# Patient Record
Sex: MALE | Race: BLACK OR AFRICAN AMERICAN | NOT HISPANIC OR LATINO | Employment: FULL TIME | URBAN - METROPOLITAN AREA
[De-identification: names, ages, dates, MRNs, and addresses within clinical notes are randomized per-mention and may not be internally consistent; named-entity substitution may affect disease eponyms.]

---

## 2017-04-24 ENCOUNTER — HOSPITAL ENCOUNTER (EMERGENCY)
Facility: CLINIC | Age: 37
Discharge: HOME | End: 2017-04-24
Attending: EMERGENCY MEDICINE | Admitting: EMERGENCY MEDICINE

## 2017-04-24 ENCOUNTER — OFFICE VISIT (OUTPATIENT)
Dept: FAMILY MEDICINE | Facility: CLINIC | Age: 37
End: 2017-04-24

## 2017-04-24 ENCOUNTER — APPOINTMENT (OUTPATIENT)
Dept: GENERAL RADIOLOGY | Facility: CLINIC | Age: 37
End: 2017-04-24
Attending: EMERGENCY MEDICINE

## 2017-04-24 VITALS
HEART RATE: 60 BPM | DIASTOLIC BLOOD PRESSURE: 86 MMHG | OXYGEN SATURATION: 100 % | HEIGHT: 72 IN | TEMPERATURE: 98.3 F | RESPIRATION RATE: 16 BRPM | SYSTOLIC BLOOD PRESSURE: 127 MMHG | BODY MASS INDEX: 32.51 KG/M2 | WEIGHT: 240 LBS

## 2017-04-24 VITALS
DIASTOLIC BLOOD PRESSURE: 80 MMHG | TEMPERATURE: 97.8 F | SYSTOLIC BLOOD PRESSURE: 120 MMHG | HEART RATE: 64 BPM | OXYGEN SATURATION: 99 %

## 2017-04-24 DIAGNOSIS — R07.9 CHEST PAIN, UNSPECIFIED TYPE: Primary | ICD-10-CM

## 2017-04-24 DIAGNOSIS — R07.89 CHEST WALL PAIN: ICD-10-CM

## 2017-04-24 LAB
ALBUMIN SERPL-MCNC: 3.7 G/DL (ref 3.4–5)
ALP SERPL-CCNC: 52 U/L (ref 40–150)
ALT SERPL W P-5'-P-CCNC: 34 U/L (ref 0–70)
ANION GAP SERPL CALCULATED.3IONS-SCNC: 6 MMOL/L (ref 3–14)
AST SERPL W P-5'-P-CCNC: 18 U/L (ref 0–45)
BASOPHILS # BLD AUTO: 0 10E9/L (ref 0–0.2)
BASOPHILS NFR BLD AUTO: 0.2 %
BILIRUB SERPL-MCNC: 0.3 MG/DL (ref 0.2–1.3)
BUN SERPL-MCNC: 17 MG/DL (ref 7–30)
CALCIUM SERPL-MCNC: 8.4 MG/DL (ref 8.5–10.1)
CHLORIDE SERPL-SCNC: 108 MMOL/L (ref 94–109)
CO2 SERPL-SCNC: 26 MMOL/L (ref 20–32)
CREAT SERPL-MCNC: 1.2 MG/DL (ref 0.66–1.25)
D DIMER PPP FEU-MCNC: NORMAL UG/ML FEU (ref 0–0.5)
DIFFERENTIAL METHOD BLD: NORMAL
EOSINOPHIL # BLD AUTO: 0.2 10E9/L (ref 0–0.7)
EOSINOPHIL NFR BLD AUTO: 2.1 %
ERYTHROCYTE [DISTWIDTH] IN BLOOD BY AUTOMATED COUNT: 13.7 % (ref 10–15)
GFR SERPL CREATININE-BSD FRML MDRD: 69 ML/MIN/1.7M2
GLUCOSE SERPL-MCNC: 79 MG/DL (ref 70–99)
HCT VFR BLD AUTO: 40.3 % (ref 40–53)
HGB BLD-MCNC: 13.7 G/DL (ref 13.3–17.7)
IMM GRANULOCYTES # BLD: 0 10E9/L (ref 0–0.4)
IMM GRANULOCYTES NFR BLD: 0.5 %
INTERPRETATION ECG - MUSE: NORMAL
LIPASE SERPL-CCNC: 105 U/L (ref 73–393)
LYMPHOCYTES # BLD AUTO: 1.9 10E9/L (ref 0.8–5.3)
LYMPHOCYTES NFR BLD AUTO: 21.6 %
MCH RBC QN AUTO: 29.8 PG (ref 26.5–33)
MCHC RBC AUTO-ENTMCNC: 34 G/DL (ref 31.5–36.5)
MCV RBC AUTO: 88 FL (ref 78–100)
MONOCYTES # BLD AUTO: 0.7 10E9/L (ref 0–1.3)
MONOCYTES NFR BLD AUTO: 7.8 %
NEUTROPHILS # BLD AUTO: 5.8 10E9/L (ref 1.6–8.3)
NEUTROPHILS NFR BLD AUTO: 67.8 %
NRBC # BLD AUTO: 0 10*3/UL
NRBC BLD AUTO-RTO: 0 /100
PLATELET # BLD AUTO: 225 10E9/L (ref 150–450)
POTASSIUM SERPL-SCNC: 4 MMOL/L (ref 3.4–5.3)
PROT SERPL-MCNC: 7.2 G/DL (ref 6.8–8.8)
RBC # BLD AUTO: 4.59 10E12/L (ref 4.4–5.9)
SODIUM SERPL-SCNC: 140 MMOL/L (ref 133–144)
TROPONIN I SERPL-MCNC: NORMAL UG/L (ref 0–0.04)
WBC # BLD AUTO: 8.6 10E9/L (ref 4–11)

## 2017-04-24 PROCEDURE — 93005 ELECTROCARDIOGRAM TRACING: CPT

## 2017-04-24 PROCEDURE — 84484 ASSAY OF TROPONIN QUANT: CPT | Performed by: EMERGENCY MEDICINE

## 2017-04-24 PROCEDURE — 80053 COMPREHEN METABOLIC PANEL: CPT | Performed by: EMERGENCY MEDICINE

## 2017-04-24 PROCEDURE — 96374 THER/PROPH/DIAG INJ IV PUSH: CPT

## 2017-04-24 PROCEDURE — 96361 HYDRATE IV INFUSION ADD-ON: CPT

## 2017-04-24 PROCEDURE — 85025 COMPLETE CBC W/AUTO DIFF WBC: CPT | Performed by: EMERGENCY MEDICINE

## 2017-04-24 PROCEDURE — 40000985 XR CHEST 2 VW

## 2017-04-24 PROCEDURE — 99207 ZZC NO CHARGE LOS: CPT | Performed by: PHYSICIAN ASSISTANT

## 2017-04-24 PROCEDURE — 83690 ASSAY OF LIPASE: CPT | Performed by: EMERGENCY MEDICINE

## 2017-04-24 PROCEDURE — 25000128 H RX IP 250 OP 636: Performed by: EMERGENCY MEDICINE

## 2017-04-24 PROCEDURE — 99285 EMERGENCY DEPT VISIT HI MDM: CPT | Mod: 25

## 2017-04-24 PROCEDURE — 85379 FIBRIN DEGRADATION QUANT: CPT | Performed by: EMERGENCY MEDICINE

## 2017-04-24 RX ORDER — KETOROLAC TROMETHAMINE 30 MG/ML
30 INJECTION, SOLUTION INTRAMUSCULAR; INTRAVENOUS ONCE
Status: COMPLETED | OUTPATIENT
Start: 2017-04-24 | End: 2017-04-24

## 2017-04-24 RX ORDER — SODIUM CHLORIDE 9 MG/ML
1000 INJECTION, SOLUTION INTRAVENOUS CONTINUOUS
Status: DISCONTINUED | OUTPATIENT
Start: 2017-04-24 | End: 2017-04-24 | Stop reason: HOSPADM

## 2017-04-24 RX ADMIN — SODIUM CHLORIDE 1000 ML: 9 INJECTION, SOLUTION INTRAVENOUS at 16:13

## 2017-04-24 RX ADMIN — KETOROLAC TROMETHAMINE 30 MG: 30 INJECTION, SOLUTION INTRAMUSCULAR at 16:13

## 2017-04-24 ASSESSMENT — ENCOUNTER SYMPTOMS
FATIGUE: 1
VOMITING: 0
SHORTNESS OF BREATH: 1
BACK PAIN: 1
FEVER: 0
COUGH: 0
DIARRHEA: 0
NAUSEA: 0

## 2017-04-24 NOTE — ED NOTES
Patient discharged to home. Patient received follow-up information as needed with PCP. Patient received discharge instructions and has no other questions at this time.

## 2017-04-24 NOTE — ED PROVIDER NOTES
History     Chief Complaint:  Chest pain and back pain    HPI   Kin Pagan is a 36 year old male with a history of anxiety who presents with chest pain and back pain. 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 He states he normally does not work the night shift. The patient went home and went to sleep and when he woke up the chest pain became more severe and started to radiate to into his back. He describes his back pain as feeling as though he pulled a muscle. Deep breathing, moving his arms, and sitting up exacerbates the pain. Eating warm food and taking ibuprofen alleviated the pain. He states that when he sits up he begins to experience shortness of breath due to the increased chest pain. He state that last night he woke up intermittently gasping for air. The patient presented to clinic today, was given 3 chewable baby aspirin, and was sent to the ED for further evaluation. He also states that he experienced pain on the outside of his left calf a couple days ago that alleviated on its own. The patient also notes that he has had a rash on his upper back for a long time. The patient has been experiencing increased stress recently with family and legal issues. The patient states that he has had similar symptoms when experiencing increased stress in the past and was told it was due to anxiety. The patient exercised 3 days ago with no symptoms. The patient does not have regular medical care due to lack of insurance. The pain in his chest does not radiate down his arm or into his neck. No nausea, vomiting, or diarrhea. No leg swelling. No recent cough or fever. No suicidal or homicidal ideation but does feel stressed.    Cardiac Risk Factors   Sex: male   Tobacco: former smoker  Hypertension: Negative  Diabetes: Negative  Hyperlipidemia: positive  Family History:  Negative    PE/DVT Risk Factors   Personal History: Negative  Recent Travel: Negative   Recent Surgery/Hospitalization: Negative  Tobacco: former smoker  Family History: Negative  Hormone Use: Negative   Cancer: Negative  Trauma: Negative      Allergies:  No known drug allergies.     Medications:    Hyoscyamine  Albuterol nebulizer     Past Medical History:    Hyperlipidemia  Anxiety  Allergic state    Past Surgical History:    History reviewed. No pertinent past surgical history.    Family History:    History reviewed. No pertinent family history.    Social History:  Marital Status: single  Presents to the ED alone  Tobacco Use: former smoker  Alcohol Use: positive  PCP: [facility]     Review of Systems   Constitutional: Positive for fatigue. Negative for fever.   Respiratory: Positive for shortness of breath. Negative for cough.    Cardiovascular: Positive for chest pain. Negative for leg swelling.   Gastrointestinal: Negative for diarrhea, nausea and vomiting.   Musculoskeletal: Positive for back pain.        Positive for left calf pain   Skin: Positive for rash.   All other systems reviewed and are negative.    Physical Exam   First Vitals:  BP: (!) 146/94  Pulse: 60  Heart Rate: 60  Temp: 98.3  F (36.8  C)  Resp: 20  Height: 182.9 cm (6')  Weight: 108.9 kg (240 lb)  SpO2: 100 %    Physical Exam  VITAL SIGNS: /63  Pulse 60  Temp 98.3  F (36.8  C) (Oral)  Resp 16  Ht 1.829 m (6')  Wt 108.9 kg (240 lb)  SpO2 100%  BMI 32.55 kg/m2   Constitutional:  Well developed, Well nourished, comfortable appearing, mildly anxious.   HENT:  Bilateral external ears normal, Mucous membranes moist, Nose normal. Neck- Normal range of motion, Supple  Respiratory:  Normal breath sounds, No respiratory distress, No wheezing,  Cardiovascular:  Normal heart rate, Normal rhythm, No murmurs, Radial pulses are equal and intact bilaterally.  GI:  Bowel sounds normal, Soft, No tenderness,   Musculoskeletal:  Intact  distal pulses, No edema, grossly unremarkable range of motion. No chest wall tenderness to palpation. No calf tenderness. No masses.   Integument:  Warm, Dry   Neurologic:  Alert, attentive and appropriately oriented  Psychiatric:  Mildly anxious    Emergency Department Course   ECG:  @ 1514  Indication: chest pain and back pain  Vent. Rate 59 bpm. WV interval 178 ms. QRS duration 100 ms. QT/QTc 404/399 ms. P-R-T axis 55 54 41.   Sinus bradycardia. Otherwise normal ECG.  No significant change when compared to previous ECG from 7/24/15   Read @ 1542 by Dr. Palomino.    Imaging:  Radiographic findings were communicated with the patient who voiced understanding of the findings.    XR Chest 2 Views  Normal.  Preliminary radiology read.      Laboratory:  CBC:  WBC 8.6, HGB 13.7, , otherwise WNL  CMP: calcium 8.4 (L), otherwise WNL (Creatinine 1.20)  Lipase: 105  Troponin: <0.015  D dimer: <0.3    Interventions:  (1745) Normal Saline, 1 liter, IV bolus  (1613) Toradol, 30 mg, IV injection    Emergency Department Course:  Nursing notes and vitals reviewed.  I performed an exam of the patient as documented above.   EKG was done, interpretation as above.  A peripheral IV was established. Blood was drawn from the patient. This was sent for laboratory testing, findings above.   The patient was sent for a chest x-ray while in the emergency department, findings above.   (1715) Pain completely relieved after Toradol. He is now able to move without discomfort. Pulses remain equal.  (1820) I rechecked the patient and updated him on his results.  Findings and plan explained to the patient. Patient discharged home with instructions regarding supportive care, medications, and reasons to return. The importance of close follow-up was reviewed.   I personally reviewed the laboratory results with the patient and answered all related questions prior to discharge.     Impression & Plan    Medical Decision Making:  Kin Pagan  presents with chest pain.  The work up in the Emergency Department is negative.  The differential diagnosis of chest pain is broad and includes life threatening etiologies such as Acute coronary syndrome, Myocardial infarction, Pulmonary Embolism, and Acute Aortic Dissection.  Other causes may include pneumonia, pneumothorax, pericarditis, pleurisy, and esophageal spasm.  No serious etiology for the chest pain was detected today during this visit.  Because of the nature of his symptoms, extensive workup including troponin, abdominal labs, and imaging were done.  Of note, the patient's pain completely resolved after Toradol.  He had no pain on exam and had resolution of his mild hypertension with treatment of this pain. Given this, I felt that acute dissection was unlikely; ddimer in a pt with no risk factors as well as lack of tachycardia, hypoxia, made PE very unlikely.    Close follow up with primary care is indicated should the pain continue, as further work up may be performed; this was made clear to Kin, who understands.  I discussed he should return immediately for any worsening symptoms including any intense pain, radiation of the pain, numbness, difficulty breathing.  He is quite comfortable with this.  Also offered resources for his depression related to life stressors and emphasized that if he feels self-harm or any other severe symptoms he may return to the ER at any time.  He agreed to this plan.    Diagnosis:    ICD-10-CM    1. Chest wall pain R07.89        Disposition:  Discharge to home.     I, Darell Ferreira, am serving as a scribe on 4/24/2017 at 3:19 PM to personally document services performed by Dr. Palomino based on my observations and the provider's statements to me.     Latricia Palomino MD  04/25/17 0001

## 2017-04-24 NOTE — ED AVS SNAPSHOT
[facility] Emergency Department    [address]    [facility] [address]    Phone:  [phone]    Fax:  [phone]                                       Kin Pagan   MRN: [id number]    Department:  [facility] Emergency Department   Date of Visit:  4/24/2017           After Visit Summary Signature Page     I have received my discharge instructions, and my questions have been answered. I have discussed any challenges I see with this plan with the nurse or doctor.    ..........................................................................................................................................  Patient/Patient Representative Signature      ..........................................................................................................................................  Patient Representative Print Name and Relationship to Patient    ..................................................               ................................................  Date                                            Time    ..........................................................................................................................................  Reviewed by Signature/Title    ...................................................              ..............................................  Date                                                            Time

## 2017-04-24 NOTE — DISCHARGE INSTRUCTIONS
Get extra rest and drink plenty of fluids. You may take acetaminophen (Tylenol) 650mg or ibuprofen (Advil/Motrin) 600mg, up to every 6 hours, with food, for fevers/aches.     See a primary care clinic for followup within the next 5-7 days if symptoms persist.    If you have any worsening/severe symptoms return to the ER right away.         Chest Wall Pain: Costochondritis    The chest pain that you have had today is caused by costochondritis. This condition is caused by an inflammation of the cartilage joining your ribs to your breastbone. It is not caused by heart or lung problems. The inflammation may have been brought on by a blow to the chest, lifting heavy objects, intense exercise, or an illness that made you cough and sneeze. It often occurs during times of emotional stress. It can be painful, but it is not dangerous. It usually goes away in 1 to 2 weeks. But it may happen again. Rarely, a more serious condition may cause symptoms similar to costochondritis. That s why it s important to watch for the warning signs listed below.  Home care  Follow these guidelines when caring for yourself at home:    If you feel that emotional stress is a cause of your condition, try to figure out the sources of that stress. It may not be obvious! Learn ways to deal with the stress in your life. This can include regular exercise, muscle relaxation, meditation, or simply taking time out for yourself. For more information about this, talk with your health care provider. Or go to your local library and look at books on  stress reduction.     You may use acetaminophen or ibuprofen to control pain, unless another pain medicine was prescribed. If you have liver disease or ever had a stomach ulcer, talk with your health care provider before using these medicines.    You can also help ease pain by using a hot, wet compress or heating pad. Use this with or without a medicated skin cream that helps relieves pain.    Do stretching  exercise as advised by your provider.    Take any prescribed medicines as directed.  Follow-up care  Follow up with your health care provider, or as advised, if you do not start to get better in the next 2 days.  When to seek medical advice  Call your health care provider right away if any of these occur:    A change in the type of pain. Call if it feels different, becomes more serious, lasts longer, or spreads into your shoulder, arm, neck, jaw, or back.    Shortness of breath or pain gets worse when you breathe    Weakness, dizziness, or fainting    Cough with dark-colored sputum (phlegm) or blood    Abdominal pain    Dark red or black stools    Fever of 100.4 F (38 C) or higher, or as directed by your health care provider    4329-2018 [employer]. [address] [place] [address], [place] [address]. All rights reserved. This information is not intended as a substitute for professional medical care. Always follow your healthcare professional's instructions.

## 2017-04-24 NOTE — ED NOTES
"Chest pain and upper back pain started yesterday while at work. Pain is \"sharp and throbbing\", worse with deep breath.    Seen at [facility] and referred to ED, given aspirin there. Patient refused EMS transport.  "

## 2017-04-24 NOTE — LETTER
[facility] EMERGENCY DEPARTMENT  [address]  [facility] [address]  [phone]    Kin Pagan  [address]  [facility] [address]  [phone] (home) NONE (work)    : 1980      To Whom it may concern:    Kin Pagan was seen in our Emergency Department today, 2017. He should not do any heavy lifting more than 5lbs through Friday.    Sincerely,        Latricia Palomino

## 2017-04-24 NOTE — NURSING NOTE
"Chief Complaint   Patient presents with     Back Pain     Mid Cx pain radiates thru upper back, SOB x2 days       Initial /80 (BP Location: Right arm, Patient Position: Chair, Cuff Size: Adult Large)  Pulse 64  Temp 97.8  F (36.6  C) (Oral)  SpO2 99% Estimated body mass index is 33 kg/(m^2) as calculated from the following:    Height as of 12/16/13: 5' 10\" (1.778 m).    Weight as of 12/16/13: 230 lb (104.3 kg).  Medication Reconciliation: complete     Latricia Bolanos CMA (AAMA)        "

## 2017-04-24 NOTE — MR AVS SNAPSHOT
"              After Visit Summary   2017    Kin Pagan    MRN: [id number]           Patient Information     Date Of Birth          1980        Visit Information        Provider Department      2017 2:15 PM Aaron Larkin PA-C [facility]        Today's Diagnoses     Chest pain, unspecified type    -  1       Follow-ups after your visit        Who to contact     If you have questions or need follow up information about today's clinic visit or your schedule please contact [facility] directly at [phone].  Normal or non-critical lab and imaging results will be communicated to you by [employer], letter or phone within 4 business days after the clinic has received the results. If you do not hear from us within 7 days, please contact the clinic through [employer] or phone. If you have a critical or abnormal lab result, we will notify you by phone as soon as possible.  Submit refill requests through [employer] or call your pharmacy and they will forward the refill request to us. Please allow 3 business days for your refill to be completed.          Additional Information About Your Visit        MyChart Information     [employer] lets you send messages to your doctor, view your test results, renew your prescriptions, schedule appointments and more. To sign up, go to [email or web address] . Click on \"Log in\" on the left side of the screen, which will take you to the Welcome page. Then click on \"Sign up Now\" on the right side of the page.     You will be asked to enter the access code listed below, as well as some personal information. Please follow the directions to create your username and password.     Your access code is: [email or web address]  Expires: 2017  2:58 PM     Your access code will  in 90 days. If you need help or a new code, please call your [facility] or [phone].        Care EveryWhere ID     This is your Care EveryWhere ID. This could be " used by other organizations to access your [facility] medical records  [id number]        Your Vitals Were     Pulse Temperature Pulse Oximetry             64 97.8  F (36.6  C) (Oral) 99%          Blood Pressure from Last 3 Encounters:   04/24/17 120/80   02/14/16 120/80   07/24/15 124/62    Weight from Last 3 Encounters:   12/16/13 230 lb (104.3 kg)   12/14/13 233 lb (105.7 kg)   12/12/13 233 lb (105.7 kg)              Today, you had the following     No orders found for display       Primary Care Provider Office Phone # Fax #    [facility] [phone] [address] [place] [address]        Thank you!     Thank you for choosing [facility]  for your care. Our goal is always to provide you with excellent care. Hearing back from our patients is one way we can continue to improve our services. Please take a few minutes to complete the written survey that you may receive in the mail after your visit with us. Thank you!             Your Updated Medication List - Protect others around you: Learn how to safely use, store and throw away your medicines at www.disposemymeds.org.          This list is accurate as of: 4/24/17  2:58 PM.  Always use your most recent med list.                   Brand Name Dispense Instructions for use    albuterol (2.5 MG/3ML) 0.083% neb solution     1 Box    Take 3 mLs (2.5 mg) by nebulization every 4 hours as needed for shortness of breath / dyspnea       hyoscyamine 0.125 MG tablet    ANASPAZ/LEVSIN    12 tablet    Take 1 tablet (125 mcg) by mouth every 6 hours as needed for cramping       order for DME     1 Device    Equipment being ordered: Nebulizer       order for DME     1 Device    Equipment being ordered: Aircast splint

## 2017-04-24 NOTE — PROGRESS NOTES
Kin is a 36 year male with substernal chest pain radiating to the upper back for 2 days and reports associated shortness of breath.  The left leg has some pain as well. I sent him to the ER.   I asked him to let me provide an ambulance and he declined.

## 2017-04-24 NOTE — ED AVS SNAPSHOT
[facility] Emergency Department    [address]    [facility] [address]    Phone:  [phone]    Fax:  [phone]                                       Kin Pagan   MRN: [id number]    Department:  [facility] Emergency Department   Date of Visit:  4/24/2017           Patient Information     Date Of Birth          1980        Your diagnoses for this visit were:     Chest wall pain        You were seen by Latricia Palomino MD.      Follow-up Information     Follow up with [facility]. Schedule an appointment as soon as possible for a visit in 2 days.    Specialty:  Family Medicine    Contact information:    [address] [facility] [address] [phone]        Discharge Instructions       Get extra rest and drink plenty of fluids. You may take acetaminophen (Tylenol) 650mg or ibuprofen (Advil/Motrin) 600mg, up to every 6 hours, with food, for fevers/aches.     See a primary care clinic for followup within the next 5-7 days if symptoms persist.    If you have any worsening/severe symptoms return to the ER right away.         Chest Wall Pain: Costochondritis    The chest pain that you have had today is caused by costochondritis. This condition is caused by an inflammation of the cartilage joining your ribs to your breastbone. It is not caused by heart or lung problems. The inflammation may have been brought on by a blow to the chest, lifting heavy objects, intense exercise, or an illness that made you cough and sneeze. It often occurs during times of emotional stress. It can be painful, but it is not dangerous. It usually goes away in 1 to 2 weeks. But it may happen again. Rarely, a more serious condition may cause symptoms similar to costochondritis. That s why it s important to watch for the warning signs listed below.  Home care  Follow these guidelines when caring for yourself at home:    If you feel that emotional stress is a  cause of your condition, try to figure out the sources of that stress. It may not be obvious! Learn ways to deal with the stress in your life. This can include regular exercise, muscle relaxation, meditation, or simply taking time out for yourself. For more information about this, talk with your health care provider. Or go to your local library and look at books on  stress reduction.     You may use acetaminophen or ibuprofen to control pain, unless another pain medicine was prescribed. If you have liver disease or ever had a stomach ulcer, talk with your health care provider before using these medicines.    You can also help ease pain by using a hot, wet compress or heating pad. Use this with or without a medicated skin cream that helps relieves pain.    Do stretching exercise as advised by your provider.    Take any prescribed medicines as directed.  Follow-up care  Follow up with your health care provider, or as advised, if you do not start to get better in the next 2 days.  When to seek medical advice  Call your health care provider right away if any of these occur:    A change in the type of pain. Call if it feels different, becomes more serious, lasts longer, or spreads into your shoulder, arm, neck, jaw, or back.    Shortness of breath or pain gets worse when you breathe    Weakness, dizziness, or fainting    Cough with dark-colored sputum (phlegm) or blood    Abdominal pain    Dark red or black stools    Fever of 100.4 F (38 C) or higher, or as directed by your health care provider    8502-3918 [employer]. [address] [place] [address]. All rights reserved. This information is not intended as a substitute for professional medical care. Always follow your healthcare professional's instructions.          24 Hour Appointment Hotline       To make an appointment at any [facility] call [phone] ([phone]). If you don't have a family doctor or clinic, we will help you  find one. [facility] are conveniently located to serve the needs of you and your family.             Review of your medicines      Our records show that you are taking the medicines listed below. If these are incorrect, please call your family doctor or clinic.        Dose / Directions Last dose taken    albuterol (2.5 MG/3ML) 0.083% neb solution   Dose:  1 ampule   Quantity:  1 Box        Take 3 mLs (2.5 mg) by nebulization every 4 hours as needed for shortness of breath / dyspnea   Refills:  0        hyoscyamine 0.125 MG tablet   Commonly known as:  ANASPAZ/LEVSIN   Dose:  0.125 mg   Quantity:  12 tablet        Take 1 tablet (125 mcg) by mouth every 6 hours as needed for cramping   Refills:  0        order for DME   Quantity:  1 Device        Equipment being ordered: Nebulizer   Refills:  0        order for DME   Quantity:  1 Device        Equipment being ordered: Aircast splint   Refills:  0                Procedures and tests performed during your visit     CBC with platelets differential    Comprehensive metabolic panel    D dimer quantitative    EKG 12 lead    Lipase    Troponin I    XR Chest 2 Views      Orders Needing Specimen Collection     None      Pending Results     Date and Time Order Name Status Description    4/24/2017 1713 XR Chest 2 Views Preliminary     4/24/2017 1646 EKG 12 lead Preliminary             Pending Culture Results     No orders found from 4/22/2017 to 4/25/2017.            Test Results From Your Hospital Stay        4/24/2017  4:22 PM      Component Results     Component Value Ref Range & Units Status    WBC 8.6 4.0 - 11.0 10e9/L Final    RBC Count 4.59 4.4 - 5.9 10e12/L Final    Hemoglobin 13.7 13.3 - 17.7 g/dL Final    Hematocrit 40.3 40.0 - 53.0 % Final    MCV 88 78 - 100 fl Final    MCH 29.8 26.5 - 33.0 pg Final    MCHC 34.0 31.5 - 36.5 g/dL Final    RDW 13.7 10.0 - 15.0 % Final    Platelet Count 225 150 - 450 10e9/L Final    Diff Method Automated Method  Final    %  Neutrophils 67.8 % Final    % Lymphocytes 21.6 % Final    % Monocytes 7.8 % Final    % Eosinophils 2.1 % Final    % Basophils 0.2 % Final    % Immature Granulocytes 0.5 % Final    Nucleated RBCs 0 0 /100 Final    Absolute Neutrophil 5.8 1.6 - 8.3 10e9/L Final    Absolute Lymphocytes 1.9 0.8 - 5.3 10e9/L Final    Absolute Monocytes 0.7 0.0 - 1.3 10e9/L Final    Absolute Eosinophils 0.2 0.0 - 0.7 10e9/L Final    Absolute Basophils 0.0 0.0 - 0.2 10e9/L Final    Abs Immature Granulocytes 0.0 0 - 0.4 10e9/L Final    Absolute Nucleated RBC 0.0  Final         4/24/2017  4:43 PM      Component Results     Component Value Ref Range & Units Status    Sodium 140 133 - 144 mmol/L Final    Potassium 4.0 3.4 - 5.3 mmol/L Final    Chloride 108 94 - 109 mmol/L Final    Carbon Dioxide 26 20 - 32 mmol/L Final    Anion Gap 6 3 - 14 mmol/L Final    Glucose 79 70 - 99 mg/dL Final    Urea Nitrogen 17 7 - 30 mg/dL Final    Creatinine 1.20 0.66 - 1.25 mg/dL Final    GFR Estimate 69 >60 mL/min/1.7m2 Final    Non  GFR Calc    GFR Estimate If Black 83 >60 mL/min/1.7m2 Final    African American GFR Calc    Calcium 8.4 (L) 8.5 - 10.1 mg/dL Final    Bilirubin Total 0.3 0.2 - 1.3 mg/dL Final    Albumin 3.7 3.4 - 5.0 g/dL Final    Protein Total 7.2 6.8 - 8.8 g/dL Final    Alkaline Phosphatase 52 40 - 150 U/L Final    ALT 34 0 - 70 U/L Final    AST 18 0 - 45 U/L Final         4/24/2017  4:43 PM      Component Results     Component Value Ref Range & Units Status    Lipase 105 73 - 393 U/L Final         4/24/2017  4:43 PM      Component Results     Component Value Ref Range & Units Status    Troponin I ES  0.000 - 0.045 ug/L Final    <0.015  The 99th percentile for upper reference range is 0.045 ug/L.  Troponin values in   the range of 0.045 - 0.120 ug/L may be associated with risks of adverse   clinical events.           4/24/2017  4:33 PM      Component Results     Component Value Ref Range & Units Status    D Dimer  0.0 - 0.50  ug/ml FEU Final    <0.3  This D-dimer assay is intended for use in conjuntion with a clinical pretest   probability assessment model to exclude pulmonary embolism (PE) and as an aid   in the diagnosis of deep venous thrombosis (DVT) in outpatients suspected of PE   or DVT. The cut-off value is 0.5 g/mL FEU.           4/24/2017  6:13 PM      Narrative     CHEST TWO VIEWS   4/24/2017 5:36 PM     HISTORY: Chest pain.    COMPARISON: 7/24/2015.        Impression     IMPRESSION: Normal.                Clinical Quality Measure: Blood Pressure Screening     Your blood pressure was checked while you were in the emergency department today. The last reading we obtained was  BP: 113/63 . Please read the guidelines below about what these numbers mean and what you should do about them.  If your systolic blood pressure (the top number) is less than 120 and your diastolic blood pressure (the bottom number) is less than 80, then your blood pressure is normal. There is nothing more that you need to do about it.  If your systolic blood pressure (the top number) is 120-139 or your diastolic blood pressure (the bottom number) is 80-89, your blood pressure may be higher than it should be. You should have your blood pressure rechecked within a year by a primary care provider.  If your systolic blood pressure (the top number) is 140 or greater or your diastolic blood pressure (the bottom number) is 90 or greater, you may have high blood pressure. High blood pressure is treatable, but if left untreated over time it can put you at risk for heart attack, stroke, or kidney failure. You should have your blood pressure rechecked by a primary care provider within the next 4 weeks.  If your provider in the emergency department today gave you specific instructions to follow-up with your doctor or provider even sooner than that, you should follow that instruction and not wait for up to 4 weeks for your follow-up visit.        Thank you for choosing  "[facility]       Thank you for choosing [facility] for your care. Our goal is always to provide you with excellent care. Hearing back from our patients is one way we can continue to improve our services. Please take a few minutes to complete the written survey that you may receive in the mail after you visit with us. Thank you!        [employer] Information     [employer] lets you send messages to your doctor, view your test results, renew your prescriptions, schedule appointments and more. To sign up, go to [email or web address] . Click on \"Log in\" on the left side of the screen, which will take you to the Welcome page. Then click on \"Sign up Now\" on the right side of the page.     You will be asked to enter the access code listed below, as well as some personal information. Please follow the directions to create your username and password.     Your access code is: [email or web address]  Expires: 2017  2:58 PM     Your access code will  in 90 days. If you need help or a new code, please call your [facility] clinic or [phone].        Care EveryWhere ID     This is your Care EveryWhere ID. This could be used by other organizations to access your [facility] medical records  [id number]        After Visit Summary       This is your record. Keep this with you and show to your community pharmacist(s) and doctor(s) at your next visit.                  "

## 2017-04-24 NOTE — PROGRESS NOTES
SUBJECTIVE:                                                    Kin Pagan is a 36 year old male who presents to clinic today for the following health issues:      Chest Pain      Onset: x2 days    Description (location/character/radiation/duration): mid Cx, hard to breath    Intensity:  severe    Accompanying signs and symptoms:        Shortness of breath: YES       Sweating: no        Nausea/vomitting: YES       Palpitations: no        Other (fevers/chills/cough/heartburn/lightheadedness): no     History (similar episodes/previous evaluation): None    Precipitating or alleviating factors:       Worse with exertion: no        Worse with breathing: YES       Related to eating: no        Better with burping: no     Therapies tried and outcome: None           Problem list and histories reviewed & adjusted, as indicated.        Reviewed and updated as needed this visit by clinical staff  Allergies  Meds       Reviewed and updated as needed this visit by Provider         I sent the patient to the ER.    Wellington Larkin PA-C

## 2018-11-30 ENCOUNTER — APPOINTMENT (OUTPATIENT)
Dept: CT IMAGING | Facility: CLINIC | Age: 38
DRG: 392 | End: 2018-11-30
Attending: EMERGENCY MEDICINE

## 2018-11-30 ENCOUNTER — HOSPITAL ENCOUNTER (INPATIENT)
Facility: CLINIC | Age: 38
LOS: 2 days | Discharge: HOME | DRG: 392 | End: 2018-12-02
Attending: EMERGENCY MEDICINE | Admitting: INTERNAL MEDICINE

## 2018-11-30 DIAGNOSIS — K57.32 DIVERTICULITIS OF COLON: ICD-10-CM

## 2018-11-30 PROBLEM — K57.20 DIVERTICULITIS OF COLON WITH PERFORATION: Status: ACTIVE | Noted: 2018-11-30

## 2018-11-30 PROBLEM — K57.80 DIVERTICULITIS OF INTESTINE WITH PERFORATION: Status: ACTIVE | Noted: 2018-11-30

## 2018-11-30 LAB
ALBUMIN UR-MCNC: NEGATIVE MG/DL
ANION GAP SERPL CALCULATED.3IONS-SCNC: 11 MMOL/L (ref 6–17)
ANION GAP SERPL CALCULATED.3IONS-SCNC: 4 MMOL/L (ref 3–14)
APPEARANCE UR: ABNORMAL
BASOPHILS # BLD AUTO: 0.1 10E9/L (ref 0–0.2)
BASOPHILS NFR BLD AUTO: 0.4 %
BILIRUB UR QL STRIP: NEGATIVE
BUN SERPL-MCNC: 22 MG/DL (ref 5–24)
BUN SERPL-MCNC: 22 MG/DL (ref 7–30)
CA-I BLD-SCNC: 4.7 MG/DL (ref 4.4–5.2)
CALCIUM SERPL-MCNC: 8.4 MG/DL (ref 8.5–10.1)
CHLORIDE BLD-SCNC: 103 MMOL/L (ref 94–109)
CHLORIDE SERPL-SCNC: 106 MMOL/L (ref 94–109)
CO2 BLD-SCNC: 27 MMOL/L (ref 20–32)
CO2 SERPL-SCNC: 29 MMOL/L (ref 20–32)
COLOR UR AUTO: YELLOW
CREAT BLD-MCNC: 1.2 MG/DL (ref 0.66–1.25)
CREAT SERPL-MCNC: 1.25 MG/DL (ref 0.66–1.25)
DIFFERENTIAL METHOD BLD: ABNORMAL
EOSINOPHIL # BLD AUTO: 0.1 10E9/L (ref 0–0.7)
EOSINOPHIL NFR BLD AUTO: 1.2 %
ERYTHROCYTE [DISTWIDTH] IN BLOOD BY AUTOMATED COUNT: 13.9 % (ref 10–15)
GFR SERPL CREATININE-BSD FRML MDRD: 65 ML/MIN/1.7M2
GFR SERPL CREATININE-BSD FRML MDRD: 69 ML/MIN/1.7M2
GLUCOSE BLD-MCNC: 107 MG/DL (ref 70–99)
GLUCOSE SERPL-MCNC: 98 MG/DL (ref 70–99)
GLUCOSE UR STRIP-MCNC: NEGATIVE MG/DL
HCT VFR BLD AUTO: 43.3 % (ref 40–53)
HCT VFR BLD CALC: 44 %PCV (ref 40–53)
HGB BLD CALC-MCNC: 15 G/DL (ref 13.3–17.7)
HGB BLD-MCNC: 14 G/DL (ref 13.3–17.7)
HGB UR QL STRIP: NEGATIVE
IMM GRANULOCYTES # BLD: 0.5 10E9/L (ref 0–0.4)
IMM GRANULOCYTES NFR BLD: 4.3 %
KETONES UR STRIP-MCNC: NEGATIVE MG/DL
LEUKOCYTE ESTERASE UR QL STRIP: NEGATIVE
LYMPHOCYTES # BLD AUTO: 2 10E9/L (ref 0.8–5.3)
LYMPHOCYTES NFR BLD AUTO: 18.2 %
MCH RBC QN AUTO: 29.6 PG (ref 26.5–33)
MCHC RBC AUTO-ENTMCNC: 32.3 G/DL (ref 31.5–36.5)
MCV RBC AUTO: 92 FL (ref 78–100)
MONOCYTES # BLD AUTO: 0.8 10E9/L (ref 0–1.3)
MONOCYTES NFR BLD AUTO: 6.9 %
MUCOUS THREADS #/AREA URNS LPF: PRESENT /LPF
NEUTROPHILS # BLD AUTO: 7.7 10E9/L (ref 1.6–8.3)
NEUTROPHILS NFR BLD AUTO: 69 %
NITRATE UR QL: NEGATIVE
NRBC # BLD AUTO: 0 10*3/UL
NRBC BLD AUTO-RTO: 0 /100
PH UR STRIP: 5 PH (ref 5–7)
PLATELET # BLD AUTO: 230 10E9/L (ref 150–450)
POTASSIUM BLD-SCNC: 3.5 MMOL/L (ref 3.4–5.3)
POTASSIUM SERPL-SCNC: 3.6 MMOL/L (ref 3.4–5.3)
RBC # BLD AUTO: 4.73 10E12/L (ref 4.4–5.9)
RBC #/AREA URNS AUTO: <1 /HPF (ref 0–2)
SODIUM BLD-SCNC: 141 MMOL/L (ref 133–144)
SODIUM SERPL-SCNC: 139 MMOL/L (ref 133–144)
SOURCE: ABNORMAL
SP GR UR STRIP: 1.03 (ref 1–1.03)
UROBILINOGEN UR STRIP-MCNC: 0 MG/DL (ref 0–2)
WBC # BLD AUTO: 11.2 10E9/L (ref 4–11)
WBC #/AREA URNS AUTO: 1 /HPF (ref 0–5)

## 2018-11-30 PROCEDURE — 25000128 H RX IP 250 OP 636: Performed by: PHYSICIAN ASSISTANT

## 2018-11-30 PROCEDURE — 99223 1ST HOSP IP/OBS HIGH 75: CPT | Mod: AI | Performed by: PHYSICIAN ASSISTANT

## 2018-11-30 PROCEDURE — 96375 TX/PRO/DX INJ NEW DRUG ADDON: CPT

## 2018-11-30 PROCEDURE — 80047 BASIC METABLC PNL IONIZED CA: CPT

## 2018-11-30 PROCEDURE — 25000128 H RX IP 250 OP 636: Performed by: EMERGENCY MEDICINE

## 2018-11-30 PROCEDURE — 96361 HYDRATE IV INFUSION ADD-ON: CPT

## 2018-11-30 PROCEDURE — 99221 1ST HOSP IP/OBS SF/LOW 40: CPT | Performed by: SURGERY

## 2018-11-30 PROCEDURE — 99285 EMERGENCY DEPT VISIT HI MDM: CPT | Mod: 25

## 2018-11-30 PROCEDURE — 85014 HEMATOCRIT: CPT

## 2018-11-30 PROCEDURE — 74177 CT ABD & PELVIS W/CONTRAST: CPT

## 2018-11-30 PROCEDURE — 96376 TX/PRO/DX INJ SAME DRUG ADON: CPT

## 2018-11-30 PROCEDURE — 81001 URINALYSIS AUTO W/SCOPE: CPT | Performed by: EMERGENCY MEDICINE

## 2018-11-30 PROCEDURE — 80048 BASIC METABOLIC PNL TOTAL CA: CPT | Performed by: EMERGENCY MEDICINE

## 2018-11-30 PROCEDURE — 12000013 ZZH R&B PEDS

## 2018-11-30 PROCEDURE — 96365 THER/PROPH/DIAG IV INF INIT: CPT | Mod: 59

## 2018-11-30 PROCEDURE — 85025 COMPLETE CBC W/AUTO DIFF WBC: CPT | Performed by: EMERGENCY MEDICINE

## 2018-11-30 RX ORDER — HYDROMORPHONE HYDROCHLORIDE 1 MG/ML
.3-.5 INJECTION, SOLUTION INTRAMUSCULAR; INTRAVENOUS; SUBCUTANEOUS
Status: DISCONTINUED | OUTPATIENT
Start: 2018-11-30 | End: 2018-12-02 | Stop reason: HOSPADM

## 2018-11-30 RX ORDER — HYDROMORPHONE HYDROCHLORIDE 1 MG/ML
0.5 INJECTION, SOLUTION INTRAMUSCULAR; INTRAVENOUS; SUBCUTANEOUS
Status: COMPLETED | OUTPATIENT
Start: 2018-11-30 | End: 2018-11-30

## 2018-11-30 RX ORDER — PROCHLORPERAZINE MALEATE 10 MG
10 TABLET ORAL EVERY 6 HOURS PRN
Status: DISCONTINUED | OUTPATIENT
Start: 2018-11-30 | End: 2018-12-02 | Stop reason: HOSPADM

## 2018-11-30 RX ORDER — ONDANSETRON 2 MG/ML
4 INJECTION INTRAMUSCULAR; INTRAVENOUS
Status: COMPLETED | OUTPATIENT
Start: 2018-11-30 | End: 2018-11-30

## 2018-11-30 RX ORDER — PROCHLORPERAZINE 25 MG
25 SUPPOSITORY, RECTAL RECTAL EVERY 12 HOURS PRN
Status: DISCONTINUED | OUTPATIENT
Start: 2018-11-30 | End: 2018-12-02 | Stop reason: HOSPADM

## 2018-11-30 RX ORDER — ACETAMINOPHEN 325 MG/1
650 TABLET ORAL EVERY 4 HOURS PRN
Status: DISCONTINUED | OUTPATIENT
Start: 2018-11-30 | End: 2018-12-02 | Stop reason: HOSPADM

## 2018-11-30 RX ORDER — SODIUM CHLORIDE 9 MG/ML
INJECTION, SOLUTION INTRAVENOUS CONTINUOUS
Status: DISCONTINUED | OUTPATIENT
Start: 2018-11-30 | End: 2018-12-01

## 2018-11-30 RX ORDER — ONDANSETRON 4 MG/1
4 TABLET, ORALLY DISINTEGRATING ORAL EVERY 6 HOURS PRN
Status: DISCONTINUED | OUTPATIENT
Start: 2018-11-30 | End: 2018-12-02 | Stop reason: HOSPADM

## 2018-11-30 RX ORDER — OXYCODONE HYDROCHLORIDE 5 MG/1
5-10 TABLET ORAL
Status: DISCONTINUED | OUTPATIENT
Start: 2018-11-30 | End: 2018-12-02 | Stop reason: HOSPADM

## 2018-11-30 RX ORDER — LIDOCAINE 40 MG/G
CREAM TOPICAL
Status: DISCONTINUED | OUTPATIENT
Start: 2018-11-30 | End: 2018-12-02 | Stop reason: HOSPADM

## 2018-11-30 RX ORDER — ONDANSETRON 2 MG/ML
4 INJECTION INTRAMUSCULAR; INTRAVENOUS EVERY 6 HOURS PRN
Status: DISCONTINUED | OUTPATIENT
Start: 2018-11-30 | End: 2018-12-02 | Stop reason: HOSPADM

## 2018-11-30 RX ORDER — SODIUM CHLORIDE 9 MG/ML
1000 INJECTION, SOLUTION INTRAVENOUS CONTINUOUS
Status: DISCONTINUED | OUTPATIENT
Start: 2018-11-30 | End: 2018-11-30

## 2018-11-30 RX ORDER — IOPAMIDOL 755 MG/ML
500 INJECTION, SOLUTION INTRAVASCULAR ONCE
Status: COMPLETED | OUTPATIENT
Start: 2018-11-30 | End: 2018-11-30

## 2018-11-30 RX ORDER — NALOXONE HYDROCHLORIDE 0.4 MG/ML
.1-.4 INJECTION, SOLUTION INTRAMUSCULAR; INTRAVENOUS; SUBCUTANEOUS
Status: DISCONTINUED | OUTPATIENT
Start: 2018-11-30 | End: 2018-12-02 | Stop reason: HOSPADM

## 2018-11-30 RX ADMIN — SODIUM CHLORIDE 1000 ML: 9 INJECTION, SOLUTION INTRAVENOUS at 08:45

## 2018-11-30 RX ADMIN — HYDROMORPHONE HYDROCHLORIDE 0.5 MG: 1 INJECTION, SOLUTION INTRAMUSCULAR; INTRAVENOUS; SUBCUTANEOUS at 20:18

## 2018-11-30 RX ADMIN — HYDROMORPHONE HYDROCHLORIDE 0.5 MG: 1 INJECTION, SOLUTION INTRAMUSCULAR; INTRAVENOUS; SUBCUTANEOUS at 13:24

## 2018-11-30 RX ADMIN — TAZOBACTAM SODIUM AND PIPERACILLIN SODIUM 3.38 G: 375; 3 INJECTION, SOLUTION INTRAVENOUS at 10:38

## 2018-11-30 RX ADMIN — SODIUM CHLORIDE 65 ML: 9 INJECTION, SOLUTION INTRAVENOUS at 09:08

## 2018-11-30 RX ADMIN — ONDANSETRON 4 MG: 2 SOLUTION INTRAMUSCULAR; INTRAVENOUS at 16:16

## 2018-11-30 RX ADMIN — Medication 0.5 MG: at 09:22

## 2018-11-30 RX ADMIN — SODIUM CHLORIDE: 9 INJECTION, SOLUTION INTRAVENOUS at 12:33

## 2018-11-30 RX ADMIN — Medication 0.5 MG: at 11:15

## 2018-11-30 RX ADMIN — SODIUM CHLORIDE: 9 INJECTION, SOLUTION INTRAVENOUS at 21:41

## 2018-11-30 RX ADMIN — HYDROMORPHONE HYDROCHLORIDE 0.5 MG: 1 INJECTION, SOLUTION INTRAMUSCULAR; INTRAVENOUS; SUBCUTANEOUS at 17:00

## 2018-11-30 RX ADMIN — TAZOBACTAM SODIUM AND PIPERACILLIN SODIUM 3.38 G: 375; 3 INJECTION, SOLUTION INTRAVENOUS at 16:16

## 2018-11-30 RX ADMIN — TAZOBACTAM SODIUM AND PIPERACILLIN SODIUM 3.38 G: 375; 3 INJECTION, SOLUTION INTRAVENOUS at 21:39

## 2018-11-30 RX ADMIN — ONDANSETRON 4 MG: 2 INJECTION INTRAMUSCULAR; INTRAVENOUS at 08:41

## 2018-11-30 RX ADMIN — IOPAMIDOL 100 ML: 755 INJECTION, SOLUTION INTRAVENOUS at 09:08

## 2018-11-30 RX ADMIN — Medication 0.5 MG: at 08:41

## 2018-11-30 ASSESSMENT — ENCOUNTER SYMPTOMS
VOMITING: 1
HEMATURIA: 0
DIARRHEA: 0
ABDOMINAL PAIN: 1
FEVER: 0
DYSURIA: 0

## 2018-11-30 NOTE — CONSULTS
General Surgery Consultation    Kin Pagan MRN# [id number]   Age: 38 year old YOB: 1980     Date of Admission:  11/30/2018    Reason for consult:      diverticulitis       Requesting physician:            Dr Radha Ordaz                Assessment and Plan:   Assessment:   Kin Pagan is a 38 year old male with acute sigmoid diverticulitis complicated by microperforation and extraluminal air. The air appears to be contained within the mesentery/retroperitoneum. The patient is hemodynamically stable and does not have signs of peritonitis.  Patient is very anxious.  I have had an extensive discussion with the patient regarding the nature of diverticular disease and, specifically, complicated diverticulitis.  If he clinically deteriorates he may need emergent open sigmoid colectomy and end colostomy.  If he clinically improves, he may consider elective laparoscopic sigmoid colectomy in the future.        Plan:   Admits for IV antibiotics, bowel rest, fluids  N.p.o. for now.  Repeat white blood cell count in the morning.  Will follow                 Chief Complaint:   Abdominal pain     History is obtained from the patient         History of Present Illness:   Kin Pagan is a 38 year old male who presents with suprapubic and RLQ abdominal pain for 6 days.  It started in the suprapubic region on 11/25. The pain improved on 11/28-11/29 but then got worse again yesterday morning. The pain is constant and feels like pressure.  Associated symptoms include with nausea, vomiting, diarrhea and chills.  His last bowel movement was today and was softer than normal. States has been having 'stringy' stools since the pain started. He has no significant history of diarrhea in the past.  He denies blood in the stool.  He has not had a colonoscopy.  He has not had similar pain in the past. States a year ago he has epigastric pain that went away after a couple days. Has never been  "hospitalized.    Prior episodes of diverticulitis: no         Past Medical History:    has a past medical history of Allergic state and Anxiety.          Past Surgical History:   No past surgical history on file.   None          Social History:     Social History   Substance Use Topics     Smoking status: Former Smoker     Smokeless tobacco: Never Used     Alcohol use Yes      Comment: socially   nonsmoker  Occasional beer  +marijuana          Family History:   Negative for diverticulosis, diverticulitis.    Negative for chronic diarrhea, inflammatory bowel disease.    Negative for colon cancer.  Father  of MI in October         Allergies:   No Known Allergies          Medications:     No current facility-administered medications on file prior to encounter.   Current Outpatient Prescriptions on File Prior to Encounter:  albuterol (2.5 MG/3ML) 0.083% nebulizer solution Take 3 mLs (2.5 mg) by nebulization every 4 hours as needed for shortness of breath / dyspnea   hyoscyamine (ANASPAZ,LEVSIN) 0.125 MG tablet Take 1 tablet (125 mcg) by mouth every 6 hours as needed for cramping (Patient not taking: Reported on 2017)   order for DME Equipment being ordered: Aircast splint   ORDER FOR DME Equipment being ordered: Nebulizer       piperacillin-tazobactam  3.375 g Intravenous Once            Review of Systems:   The 10 point review of systems is negative other than noted in the HPI.  +SOB/chest pressure intermittently with \"talking loud\"          Physical Exam:   /72  Pulse 92  Temp 98.3  F (36.8  C) (Temporal)  Resp 18  Ht 1.829 m (6')  Wt 106.6 kg (235 lb)  SpO2 96%  BMI 31.87 kg/m2  General - Well developed, well nourished male in no apparent distress  HEENT:  Head normocephalic and atraumatic, pupils equal and round, conjunctivae clear, no scleral icterus, mucous membranes moist, external ears and nose normal  Neck: Supple without thyromegaly or masses  Lymphatic: No cervical, or supraclavicular " lymphadenopathy  Lungs: Breathing comfortably on RA  Heart: regular rate and rhythm  Abdomen:Obese, soft, rounded, mildly distended with moderate tenderness noted in the suprapubic region, in the right lower quadrant and in the left lower quadrant. Voluntary guarding in the lower quadrants.  Extremities: Warm without edema  Neurologic: nonfocal  Psychiatric: Mood and affect appropriate  Skin: Without lesions, rashes, or juandice         Data:   WBC 11.2  BMP wnl except Cr 1.25      Imaging:  All imaging studies reviewed by me. Official read below. On my review with radiology, there is what appears to be contained rupture adjacent to the sigmoid in the mesentery without free air outside the retroperitoneum. There is no drainable abscess.    Results for orders placed or performed during the hospital encounter of 11/30/18   CT Abdomen Pelvis w Contrast    Narrative    CT ABDOMEN AND PELVIS WITH CONTRAST  11/30/2018 9:13 AM     HISTORY:  Abdominal pain.      TECHNIQUE: Axial images from the lung bases to the symphysis are  performed with additional coronal reformatted images. 100 mL of Isovue  370 are given intravenously.  Radiation dose for this scan was reduced  using automated exposure control, adjustment of the mA and/or kV  according to patient size, or iterative reconstruction technique.     FINDINGS:  The lung bases are clear.    Abdomen: Small liver cysts are noted in the right and left hepatic  lobe measuring less than 1 cm each in size. Liver is otherwise  unremarkable. The spleen, contracted gallbladder, pancreas, adrenal  glands and kidneys are unremarkable. No hydronephrosis or obvious  renal calculi. Probable tiny subcentimeter lower pole left renal cyst.  This is too small to characterize by CT but of doubtful clinical  significance. Patient has an incidental circumaortic left renal vein  which is a normal anatomic variant. No enlarged lymph nodes. The bowel  is normal in caliber without obstruction, but  there is marked  inflammation around the sigmoid region consistent with acute  diverticulitis. Small foci of air are noted in the adjacent mesentery,  most likely a diverticular rupture. No discrete abscess is currently  evident. Trace fluid is noted.    Pelvis: The bladder, prostate and rectum are unremarkable. No enlarged  pelvic or inguinal lymph nodes. Free pelvic fluid is present likely  related to the diverticular inflammation and suspected diverticular  rupture. Bone window examination is unremarkable.      Impression    IMPRESSION: Diverticulitis with probable diverticular rupture with  free air are noted in the adjacent mesentery. Surgical consultation  suggested. Small amount of free pelvic fluid is also noted. No other  acute changes are evident in the abdomen or pelvis to account for  patient's symptoms.    KENDRA GUZMÁN MD              Time spent with the patient, reviewing the EMR, reviewing laboratory and imaging studies, of which more than 50% was counseling and coordinating care:  30 minutes.    Hiral Logan MD

## 2018-11-30 NOTE — ED NOTES
[facility]  ED Nurse Handoff Report    Kin Pagan is a 38 year old male   ED Chief complaint: Abdominal Pain  . ED Diagnosis:   Final diagnoses:   Diverticulitis of colon     Allergies: No Known Allergies    Code Status: Full Code  Activity level - Baseline/Home:  Independent. Activity Level - Current:   Independent. Lift room needed: No. Bariatric: No   Needed: No   Isolation: No. Infection: Not Applicable.     Vital Signs:   Vitals:    11/30/18 0823 11/30/18 0845 11/30/18 0900 11/30/18 0930   BP:   103/89    Pulse:       Resp:       Temp:       TempSrc:       SpO2: 97% 98% (!) 77% 100%   Weight:       Height:           Cardiac Rhythm:  ,      Pain level: 0-10 Pain Scale: 9  Patient confused: No. Patient Falls Risk: Yes.   Elimination Status: Has voided   Patient Report - Initial Complaint: abdominal pain. Focused Assessment: Pt reports mid-lower to lower-right abdominal pain started Sunday. Increased pain with activity, N/V. Denies urinary symptoms, diarrhea, fevers.  Tests Performed:   Labs Ordered and Resulted from Time of ED Arrival Up to the Time of Departure from the ED   ROUTINE UA WITH MICROSCOPIC - Abnormal; Notable for the following:        Result Value    Mucous Urine Present (*)     All other components within normal limits   CBC WITH PLATELETS DIFFERENTIAL - Abnormal; Notable for the following:     WBC 11.2 (*)     Abs Immature Granulocytes 0.5 (*)     All other components within normal limits   BASIC METABOLIC PANEL - Abnormal; Notable for the following:     Calcium 8.4 (*)     All other components within normal limits   ISTAT BASIC MET ICA HCT POCT - Abnormal; Notable for the following:     Glucose 107 (*)     All other components within normal limits   PULSE OXIMETRY NURSING   ISTAT GAS OR ELECTROLYTE NURSING POCT   FREE WATER     CT Abdomen Pelvis w Contrast   Final Result   IMPRESSION: Diverticulitis with probable diverticular rupture with   free air are noted in  the adjacent mesentery. Surgical consultation   suggested. Small amount of free pelvic fluid is also noted. No other   acute changes are evident in the abdomen or pelvis to account for   patient's symptoms.      KENDRA GUZMÁN MD      .   Treatments provided: see MAR  Family Comments: NA  OBS brochure/video discussed/provided to patient:  No  ED Medications:   Medications   0.9% sodium chloride BOLUS (1,000 mLs Intravenous New Bag 11/30/18 0845)     Followed by   sodium chloride 0.9% infusion (not administered)   HYDROmorphone (PF) (DILAUDID) injection 0.5 mg (0.5 mg Intravenous Given 11/30/18 0922)   piperacillin-tazobactam (ZOSYN) infusion 3.375 g (not administered)   ondansetron (ZOFRAN) injection 4 mg (4 mg Intravenous Given 11/30/18 0841)   0.9% sodium chloride BOLUS (0 mLs Intravenous Stopped 11/30/18 0910)   iopamidol (ISOVUE-370) solution 500 mL (100 mLs Intravenous Given 11/30/18 0908)     Drips infusing:  No  For the majority of the shift, the patient's behavior Green. Interventions performed were none.     Severe Sepsis OR Septic Shock Diagnosis Present: No      ED Nurse Name/Phone Number: Dania Vickers,   9:49 AM    RECEIVING UNIT ED HANDOFF REVIEW    Above ED Nurse Handoff Report was reviewed: Yes  Reviewed by: Jackie Lopez on November 30, 2018 at 11:21 AM

## 2018-11-30 NOTE — PHARMACY-ADMISSION MEDICATION HISTORY
Admission medication history interview status for this patient is complete. See [facility] admission navigator for allergy information, prior to admission medications and immunization status.     Medication history interview source(s):Patient  Medication history resources (including written lists, pill bottles, clinic record): [facility] list  Primary pharmacy: [facility]    Changes made to PTA medication list:  Added:   Deleted: Levsin  Changed:     Actions taken by pharmacist (provider contacted, etc):None     Additional medication history information:None    Medication reconciliation/reorder completed by provider prior to medication history? No    For patients on insulin therapy: No    Prior to Admission medications    Medication Sig Last Dose Taking? Auth Provider   albuterol (2.5 MG/3ML) 0.083% nebulizer solution Take 3 mLs (2.5 mg) by nebulization every 4 hours as needed for shortness of breath / dyspnea More than a month at Unknown time  Pam Manjarrez MD

## 2018-11-30 NOTE — ED TRIAGE NOTES
ABCs intact. Pt c/o mid lower abdominal pain x 1 week. Pt states he vomited the 1st day, but hasn't since. C/o small stools. Denies urinary symptoms.

## 2018-11-30 NOTE — IP AVS SNAPSHOT
MRN:[id number]                      After Visit Summary   11/30/2018    Kin Pagan    MRN: [id number]           Thank you!     Thank you for choosing [facility] for your care. Our goal is always to provide you with excellent care. Hearing back from our patients is one way we can continue to improve our services. Please take a few minutes to complete the written survey that you may receive in the mail after you visit. If you would like to speak to someone directly about your visit please contact Patient Relations at [phone]. Thank you!          Patient Information     Date Of Birth          1980        Designated Caregiver       Most Recent Value    Caregiver    Will someone help with your care after discharge? no      About your hospital stay     You were admitted on:  November 30, 2018 You last received care in the:  [facility]    You were discharged on:  December 2, 2018       Who to Call     For medical emergencies, please call [phone].  For non-urgent questions about your medical care, please call your primary care provider or clinic, [phone]          Attending Provider     Provider Specialty    Radha Ordaz MD Emergency Medicine    [facility]       Primary Care Provider Office Phone # Fax #    [facility] [phone]      After Care Instructions     Activity       Your activity upon discharge: activity as tolerated            Diet       Follow this diet upon discharge: low residue diet for the next 2-3 weeks (softer, easy to digest foods)                  Follow-up Appointments     Follow-up and recommended labs and tests        Recommend follow up with Dr. Logan of general surgery in 2-3 weeks to discuss possible future surgery for prevention of recurrent diverticulitis.  Call [phone] to make an appointment.  Dr. Logan saw you while you were in the hospital.    "               Pending Results     No orders found from 2018 to 2018.            Statement of Approval     Ordered          18 1108  I have reviewed and agree with all the recommendations and orders detailed in this document.  EFFECTIVE NOW     Approved and electronically signed by:  Thomas Cook MD             Admission Information     Date & Time Provider Department Dept. Phone    2018 Thomas Cook MD [facility] [phone]      Your Vitals Were     Blood Pressure Pulse Temperature Respirations Height Weight    122/74 80 98.1  F (36.7  C) (Oral) 18 1.829 m (6') 106.6 kg (235 lb)    Pulse Oximetry BMI (Body Mass Index)                100% 31.87 kg/m2          MyChart Information     [employer] lets you send messages to your doctor, view your test results, renew your prescriptions, schedule appointments and more. To sign up, go to [email or web address] . Click on \"Log in\" on the left side of the screen, which will take you to the Welcome page. Then click on \"Sign up Now\" on the right side of the page.     You will be asked to enter the access code listed below, as well as some personal information. Please follow the directions to create your username and password.     Your access code is: [email or web address]  Expires: 3/2/2019 11:11 AM     Your access code will  in 90 days. If you need help or a new code, please call your [place] clinic or [phone].        Care EveryWhere ID     This is your Care EveryWhere ID. This could be used by other organizations to access your [place] medical records  [id number]        Equal Access to Services     [facility] AH: Hadii milind Castellon, waaxda luqadaha, qaybta kaalsarah mcrae. So [facility] [phone].    ATENCIÓN: Si habla español, tiene a leonardo disposición servicios gratuitos de asistencia lingüística. Llame al [phone].    We comply with applicable federal civil " rights laws and [place] laws. We do not discriminate on the basis of race, color, national origin, age, disability, sex, sexual orientation, or gender identity.               Review of your medicines      START taking        Dose / Directions    amoxicillin-clavulanate 875-125 MG tablet   Commonly known as:  AUGMENTIN   Used for:  Diverticulitis of colon        Dose:  1 tablet   Take 1 tablet by mouth 2 times daily for 12 days   Quantity:  24 tablet   Refills:  0         CONTINUE these medicines which have NOT CHANGED        Dose / Directions    albuterol (2.5 MG/3ML) 0.083% neb solution   Commonly known as:  PROVENTIL   Used for:  Cough, Acute bronchitis        Dose:  1 ampule   Take 3 mLs (2.5 mg) by nebulization every 4 hours as needed for shortness of breath / dyspnea   Quantity:  1 Box   Refills:  0            Where to get your medicines      These medications were sent to [facility] [address] [address] [address]     Phone:  [phone]     amoxicillin-clavulanate 875-125 MG tablet                Protect others around you: Learn how to safely use, store and throw away your medicines at www.disposemymeds.org.        ANTIBIOTIC INSTRUCTION     You've Been Prescribed an Antibiotic - Now What?  Your healthcare team thinks that you or your loved one might have an infection. Some infections can be treated with antibiotics, which are powerful, life-saving drugs. Like all medications, antibiotics have side effects and should only be used when necessary. There are some important things you should know about your antibiotic treatment.      Your healthcare team may run tests before you start taking an antibiotic.    Your team may take samples (e.g., from your blood, urine or other areas) to run tests to look for bacteria. These test can be important to determine if you need an antibiotic at all and, if you do, which antibiotic will work best.       Within a few days, your healthcare team might change or even stop your antibiotic.    Your team may start you on an antibiotic while they are working to find out what is making you sick.    Your team might change your antibiotic because test results show that a different antibiotic would be better to treat your infection.    In some cases, once your team has more information, they learn that you do not need an antibiotic at all. They may find out that you don't have an infection, or that the antibiotic you're taking won't work against your infection. For example, an infection caused by a virus can't be treated with antibiotics. Staying on an antibiotic when you don't need it is more likely to be harmful than helpful.      You may experience side effects from your antibiotic.    Like all medications, antibiotics have side effects. Some of these can be serious.    Let you healthcare team know if you have any known allergies when you are admitted to the hospital.    One significant side effect of nearly all antibiotics is the risk of severe and sometimes deadly diarrhea caused by Clostridium difficile (C. Difficile). This occurs when a person takes antibiotics because some good germs are destroyed. Antibiotic use allows C. diificile to take over, putting patients at high risk for this serious infection.    As a patient or caregiver, it is important to understand your or your loved one's antibiotic treatment. It is especially important for caregivers to speak up when patients can't speak for themselves. Here are some important questions to ask your healthcare team.    What infection is this antibiotic treating and how do you know I have that infection?    What side effects might occur from this antibiotic?    How long will I need to take this antibiotic?    Is it safe to take this antibiotic with other medications or supplements (e.g., vitamins) that I am taking?     Are there any special directions I need to know  about taking this antibiotic? For example, should I take it with food?    How will I be monitored to know whether my infection is responding to the antibiotic?    What tests may help to make sure the right antibiotic is prescribed for me?      Information provided by:  www.cdc.gov/getsmart  U.S. Department of Health and Human Services  Centers for disease Control and Prevention  National Center for Emerging and Zoonotic Infectious Diseases  Division of Healthcare Quality Promotion             Medication List: This is a list of all your medications and when to take them. Check marks below indicate your daily home schedule. Keep this list as a reference.      Medications           Morning Afternoon Evening Bedtime As Needed    albuterol (2.5 MG/3ML) 0.083% neb solution   Commonly known as:  PROVENTIL   Take 3 mLs (2.5 mg) by nebulization every 4 hours as needed for shortness of breath / dyspnea                                amoxicillin-clavulanate 875-125 MG tablet   Commonly known as:  AUGMENTIN   Take 1 tablet by mouth 2 times daily for 12 days

## 2018-11-30 NOTE — PLAN OF CARE
Problem: Patient Care Overview  Goal: Plan of Care/Patient Progress Review  VS /63  Pulse 77  Temp 99  F (37.2  C) (Oral)  Resp 16  Ht 1.829 m (6')  Wt 106.6 kg (235 lb)  SpO2 95%  BMI 31.87 kg/m2  Lung sounds Clear  O2 Room air  NPO - with ice chips  IVF NS 100ml/hr  Activity up with SBA  Pain Complaints of abdominal pain - PRN IV dilaudid given.  Patient/family centered care Cares explained to patient.   Discharge plan Continue IV abx, bowel rest, pain managment and NPO status.

## 2018-11-30 NOTE — IP AVS SNAPSHOT
[facility]    [address]    [facility] [address]    Phone:  [phone]    Fax:  [phone]                                       After Visit Summary   11/30/2018    Kin Pagan    MRN: [id number]           After Visit Summary Signature Page     I have received my discharge instructions, and my questions have been answered. I have discussed any challenges I see with this plan with the nurse or doctor.    ..........................................................................................................................................  Patient/Patient Representative Signature      ..........................................................................................................................................  Patient Representative Print Name and Relationship to Patient    ..................................................               ................................................  Date                                   Time    ..........................................................................................................................................  Reviewed by Signature/Title    ...................................................              ..............................................  Date                                               Time          22EPIC Rev 08/18

## 2018-11-30 NOTE — ED PROVIDER NOTES
"  History     Chief Complaint:  Abdominal Pain    The history is provided by the patient.      Kin Pagan is an otherwise healthy 38 year old male who presents for evaluation of abdominal pain. The patient reports that 5 days ago he felt rather sudden onset of lower abdominal pain in the afternoon described as a \"tight\" feeling. Notes that he then experienced intractable vomiting and shaking. The patient took 3 ibuprofen and 2 Tylenol at that point which took away his pain, but the pressure remained. States that his BM's have been little pieces at a time since then which may be due to his decreased appetite. Patient reports that he felt better yesterday and ate a full meal of chinese food. This morning at 4:00 AM, the patient experienced return of his abdominal pain and \"tightness\" but denies vomiting at that time. Denies any past history of this pain and states that today is the first time he has sought medical evaluation for this. Also states that he has cold sores all over his mouth. Patient denies any past history of abdominal surgeries. Patient seeks evaluation usually at [place]. Patient states that he smokes marijuana but otherwise denies illicit drug use. Patient denies diarrhea, fever, urinary symptoms including dysuria, hematuria, or other complaint.      Allergies:  No known drug allergies     Medications:    Albuterol nebulizer  Hyoscyamine    Past Medical History:    Allergic states  Anxiety     Past Surgical History:    History reviewed. No pertinent surgical history.     Family History:    Diabetes  CVD  MI    Social History:  Presents alone   Tobacco use: Former smoker   Alcohol use: Yes (socially)  PCP: [facility]    Marital Status:  Single     Review of Systems   Constitutional: Negative for fever.   Gastrointestinal: Positive for abdominal pain and vomiting. Negative for diarrhea.   Genitourinary: Negative.  Negative for dysuria and hematuria.   All other systems reviewed " and are negative.    Physical Exam     Patient Vitals for the past 24 hrs:   BP Temp Temp src Pulse Resp SpO2 Height Weight   11/30/18 0930 - - - - - 100 % - -   11/30/18 0900 103/89 - - - - (!) 77 % - -   11/30/18 0845 - - - - - 98 % - -   11/30/18 0823 - - - - - 97 % - -   11/30/18 0821 134/72 - - - - - - -   11/30/18 0726 115/79 98.3  F (36.8  C) Temporal 92 18 99 % - -   11/30/18 0725 - - - - - - 1.829 m (6') 106.6 kg (235 lb)        Physical Exam    Nursing note and vitals reviewed.    Constitutional: Pleasant and well groomed. Appears uncomfortable with movement.         HENT:    Mouth/Throat: Oropharynx is without swelling or erythema. Oral mucosa moist.    Eyes: Conjunctivae are normal. No scleral icterus.    Neck: Neck supple.   Cardiovascular: Normal rate, regular rhythm and intact distal pulses.    Pulmonary/Chest: Effort normal and breath sounds normal.   Abdominal:   No distension.  Diffusely tenderness with guarding in lower belly, left greater than right.  Musculoskeletal:  No edema, No calf tenderness  Neurological:Alert. Coordination normal.   Skin: Skin is warm and dry.   Psychiatric: Normal mood and affect.     Emergency Department Course     Imaging:  Radiographic findings were communicated with the patient who voiced understanding of the findings.    CT Abd/pelvis with contrast:  IMPRESSION: Diverticulitis with probable diverticular rupture with free air are noted in the adjacent mesentery. Surgical consultation suggested. Small amount of free pelvic fluid is also noted. No other acute changes are evident in the abdomen or pelvis to account for patient's symptoms.    Imaging independently reviewed and agree with radiologist interpretation.      Laboratory:  CBC: WBC 11.2 (H) o/w WNL (HGB 14.0, )   BMP: Ca 8.4 (L) o/w WNL (Creatinine 1.25)   0848: ISTAT Basic Met Ica HCT POCT:  (H) o/w WNL (Creatinine 1.2)    UA with micro: Mucous present o/w negative     Interventions:  0841:  Zofran 4 mg IV   0845: NS 1L IV Bolus     0922: Dilaudid 0.5 mg IV  Zosyn 3.375 g IV Infusion   The patient's symptoms were partially improved with parenteral narcotics.    Emergency Department Course:  Past medical records, nursing notes, and vitals reviewed.  0825: I performed an exam of the patient and obtained history, as documented above.     IV inserted and blood drawn. Above interventions provided. Blood was sent to the lab for further testing, results above.   The patient provided a urine sample here in the emergency department. This was sent for laboratory testing, findings above.  The patient was sent for a CT while in the emergency department, findings above.     0932: Discussed the patient with Dr. Logan of general surgery, who recommended admission to a med bed. Surgical management is not indicated at this time.    0934: Findings and plan explained to the Patient who consents to admission.     0943: Discussed the patient with Gauri Chester for Dr. Cook, who will admit the patient to a med bed for further monitoring, evaluation, and treatment.      Impression & Plan      Medical Decision Making:  The patient presented with diffuse abdominal pain. The differential diagnosis included but was not limited to appendicitis, diverticulitis, UTI, pyelonephritis, ovarian cyst/torsion, colitis. CT commented on a small amount of free air in the mesentery. I did review the CT with on call surgery who states that this is a microperforation and that surgical management is not indicated at this time. IV antibiotics were initiated. Gauri Chester  (with Dr. Cook) has accepted the patient for admission for ongoing evaluation and management.  Due to continued tenderness admission is indicated for parenteral antibiotics, parenteral pain medication, IV hydration as well as ongoing evaluation, monitoring and management.  Parenteral antibiotics were initiated in the ED.     Final Diagnosis:    ICD-10-CM    1.  Diverticulitis of colon K57.32     with microperforation       Plan:   Admit under the care of Dr. Cook.    Scribe Disclosure:  I, Felton Collins, am serving as a scribe at 8:10 AM on 11/30/2018 to document services personally performed by Radha Ordaz MD based on my observations and the provider's statements to me.   11/30/2018   [facility] EMERGENCY DEPARTMENT     Radha Ordaz MD  11/30/18 7243

## 2018-11-30 NOTE — H&P
History and Physical     Kin Pagan MRN# [id number]   YOB: 1980 Age: 38 year old      Date of Admission:  11/30/2018    Primary care provider: [facility], [place]          Assessment and Plan:   Kin Pagan is a 38 year old male with no significant PMH, who presents with abdominal pain.     1. Acute diverticulitis with perforation - 5 days of RLQ abd pain, vomiting on the first day otherwise no further N/V or diarrhea. Denies blood in stool. Notes stools have been smaller and stringy at times. Denies fever. WBC is elevated at 11.2, afebrile here. UA clear. CT abd/pelvis shows diverticulitis with probably perforation. ED provider did contact general surgery who felt CT scan findings suggested a microperforation and recommended conservative management. Given IV Zosyn in ED, will continue with IVFs, pain control and supportive cares. NPO for now. Surgery consult      Prophylaxis - mechanical   Code status - Full Code  Dispo - admit to inpatient                Chief Complaint:   abd pain          History of Present Illness:   Kin Pagan is a 38 year old male with no significant PMH, who presents with abdominal pain. Pt reports onset of RLQ abd pain 5 days ago. He noted vomiting on the first day but none since. He denies diarrhea or blood in stool. He notes his stools have been smaller and stringy at times. He denies fever or chills. He notes his symptoms started to improve yesterday but worsened again today prompting him to come to the ED. He further denies chest pain, SOB or dysuria.  In the ED, VSS. BMP is unremarkable. CBC shows WBC of 11.2, otherwise unremarkable. UA normal. CT abd/pelvis shows diverticulitis and probable perforation, no abscess. He was given 1L NS bolus, 0.5 mg IV dilaudid x3, 4 mg IV zofran and IV Zosyn.     Hx obtained by speaking with ED physician, chart review and pt interview.               Past Medical History:     Past Medical History:    Diagnosis Date     Allergic state      Anxiety                Past Surgical History:   No previous pertinent surgical hx          Social History:     Social History     Social History     Marital status: Single     Spouse name: N/A     Number of children: N/A     Years of education: N/A     Occupational History     Not on file.     Social History Main Topics     Smoking status: Former Smoker     Smokeless tobacco: Never Used     Alcohol use Yes      Comment: socially     Drug use: Yes     Special: Marijuana      Comment: marihuana ocassionally     Sexual activity: Yes     Other Topics Concern     Not on file     Social History Narrative               Family History:     Family History   Problem Relation Age of Onset     Diabetes Paternal Grandmother      Cerebrovascular Disease Paternal Grandmother               Allergies:    No Known Allergies            Medications:     Prior to Admission medications    Medication Sig Last Dose Taking? Auth Provider   albuterol (2.5 MG/3ML) 0.083% nebulizer solution Take 3 mLs (2.5 mg) by nebulization every 4 hours as needed for shortness of breath / dyspnea  Yes Pam Manjarrez MD   hyoscyamine (ANASPAZ,LEVSIN) 0.125 MG tablet Take 1 tablet (125 mcg) by mouth every 6 hours as needed for cramping  Patient not taking: Reported on 4/24/2017   Brandt Diaz MD   order for DME Equipment being ordered: Aircast splint   Gertrude Hart APRN CNP   ORDER FOR DME Equipment being ordered: Nebulizer   Pam Manjarrez MD              Review of Systems:   A Comprehensive greater than 10 system review of systems was carried out.  Pertinent positives and negatives are noted above.  Otherwise negative for contributory information.     Review Of Systems  Skin: negative  Eyes: negative  Ears/Nose/Throat: negative  Respiratory: No shortness of breath, dyspnea on exertion, cough, or hemoptysis  Cardiovascular: negative  Gastrointestinal: negative  Genitourinary:  negative  Musculoskeletal: negative  Neurologic: negative  Psychiatric: negative  Hematologic/Lymphatic/Immunologic: negative  Endocrine: negative             Physical Exam:   Blood pressure 103/89, pulse 92, temperature 98.3  F (36.8  C), temperature source Temporal, resp. rate 18, height 1.829 m (6'), weight 106.6 kg (235 lb), SpO2 100 %.  Exam:  GENERAL:  Comfortable.  PSYCH: pleasant, oriented, No acute distress.  HEENT:  Atraumatic, normocephalic. Normal conjunctiva, normal hearing, and oropharynx is normal.  NECK:  Supple, no neck vein distention  HEART:  Normal S1, S2 with no murmur, no pericardial rub, gallops or S3 or S4.  LUNGS:  Clear to auscultation, normal Respiratory effort. No wheezing, rales or ronchi.  GI:  Soft, hypoactive in RLQ, otherwise normal bowel sounds. Diffuse abdominal tenderness, greatest in RLQ, non distended.   EXTREMITIES:  No pedal edema, +2 pulses bilateral and equal.  SKIN:  Dry to touch, No rash, wound or ulcerations.  NEUROLOGIC:  CN 2-12 intact, BL 5/5 symmetric upper and lower extremity strength, sensation is intact with no focal deficits.               Data:       Recent Labs  Lab 11/30/18  0844 11/30/18  0821   WBC  --  11.2*   HGB 15.0 14.0   HCT  --  43.3   MCV  --  92   PLT  --  230       Recent Labs  Lab 11/30/18  0844 11/30/18  0821    139   POTASSIUM 3.5 3.6   CHLORIDE 103 106   CO2  --  29   ANIONGAP 11 4   * 98   BUN 22 22   CR  --  1.25   GFRESTIMATED 69 65   GFRESTBLACK 83 79   ISAÍAS  --  8.4*       Recent Labs  Lab 11/30/18  0810   COLOR Yellow   APPEARANCE Slightly Cloudy   URINEGLC Negative   URINEBILI Negative   URINEKETONE Negative   SG 1.029   UBLD Negative   URINEPH 5.0   PROTEIN Negative   NITRITE Negative   LEUKEST Negative   RBCU <1   WBCU 1       Recent Results (from the past 48 hour(s))   CT Abdomen Pelvis w Contrast    Narrative    CT ABDOMEN AND PELVIS WITH CONTRAST  11/30/2018 9:13 AM     HISTORY:  Abdominal pain.      TECHNIQUE: Axial  images from the lung bases to the symphysis are  performed with additional coronal reformatted images. 100 mL of Isovue  370 are given intravenously.  Radiation dose for this scan was reduced  using automated exposure control, adjustment of the mA and/or kV  according to patient size, or iterative reconstruction technique.     FINDINGS:  The lung bases are clear.    Abdomen: Small liver cysts are noted in the right and left hepatic  lobe measuring less than 1 cm each in size. Liver is otherwise  unremarkable. The spleen, contracted gallbladder, pancreas, adrenal  glands and kidneys are unremarkable. No hydronephrosis or obvious  renal calculi. Probable tiny subcentimeter lower pole left renal cyst.  This is too small to characterize by CT but of doubtful clinical  significance. Patient has an incidental circumaortic left renal vein  which is a normal anatomic variant. No enlarged lymph nodes. The bowel  is normal in caliber without obstruction, but there is marked  inflammation around the sigmoid region consistent with acute  diverticulitis. Small foci of air are noted in the adjacent mesentery,  most likely a diverticular rupture. No discrete abscess is currently  evident. Trace fluid is noted.    Pelvis: The bladder, prostate and rectum are unremarkable. No enlarged  pelvic or inguinal lymph nodes. Free pelvic fluid is present likely  related to the diverticular inflammation and suspected diverticular  rupture. Bone window examination is unremarkable.      Impression    IMPRESSION: Diverticulitis with probable diverticular rupture with  free air are noted in the adjacent mesentery. Surgical consultation  suggested. Small amount of free pelvic fluid is also noted. No other  acute changes are evident in the abdomen or pelvis to account for  patient's symptoms.    MD Gauri MARQUIS PA-C    This patient was seen and discussed with Dr. Cook who agrees with the current plans as outlined above.

## 2018-12-01 LAB
ANION GAP SERPL CALCULATED.3IONS-SCNC: 5 MMOL/L (ref 3–14)
BASOPHILS # BLD AUTO: 0 10E9/L (ref 0–0.2)
BASOPHILS NFR BLD AUTO: 0.3 %
BUN SERPL-MCNC: 13 MG/DL (ref 7–30)
CALCIUM SERPL-MCNC: 8.2 MG/DL (ref 8.5–10.1)
CHLORIDE SERPL-SCNC: 106 MMOL/L (ref 94–109)
CO2 SERPL-SCNC: 28 MMOL/L (ref 20–32)
CREAT SERPL-MCNC: 1.39 MG/DL (ref 0.66–1.25)
DIFFERENTIAL METHOD BLD: ABNORMAL
EOSINOPHIL # BLD AUTO: 0.1 10E9/L (ref 0–0.7)
EOSINOPHIL NFR BLD AUTO: 0.5 %
ERYTHROCYTE [DISTWIDTH] IN BLOOD BY AUTOMATED COUNT: 13.9 % (ref 10–15)
GFR SERPL CREATININE-BSD FRML MDRD: 58 ML/MIN/1.7M2
GLUCOSE SERPL-MCNC: 101 MG/DL (ref 70–99)
HCT VFR BLD AUTO: 38.8 % (ref 40–53)
HGB BLD-MCNC: 12.7 G/DL (ref 13.3–17.7)
IMM GRANULOCYTES # BLD: 0.5 10E9/L (ref 0–0.4)
IMM GRANULOCYTES NFR BLD: 3 %
LYMPHOCYTES # BLD AUTO: 1.8 10E9/L (ref 0.8–5.3)
LYMPHOCYTES NFR BLD AUTO: 11.6 %
MCH RBC QN AUTO: 29.6 PG (ref 26.5–33)
MCHC RBC AUTO-ENTMCNC: 32.7 G/DL (ref 31.5–36.5)
MCV RBC AUTO: 90 FL (ref 78–100)
MONOCYTES # BLD AUTO: 1.2 10E9/L (ref 0–1.3)
MONOCYTES NFR BLD AUTO: 7.6 %
NEUTROPHILS # BLD AUTO: 11.9 10E9/L (ref 1.6–8.3)
NEUTROPHILS NFR BLD AUTO: 77 %
NRBC # BLD AUTO: 0 10*3/UL
NRBC BLD AUTO-RTO: 0 /100
PLATELET # BLD AUTO: 213 10E9/L (ref 150–450)
POTASSIUM SERPL-SCNC: 3.7 MMOL/L (ref 3.4–5.3)
RBC # BLD AUTO: 4.29 10E12/L (ref 4.4–5.9)
SODIUM SERPL-SCNC: 139 MMOL/L (ref 133–144)
WBC # BLD AUTO: 15.5 10E9/L (ref 4–11)

## 2018-12-01 PROCEDURE — 99207 ZZC CDG-MDM COMPONENT: MEETS LOW - DOWN CODED: CPT | Performed by: INTERNAL MEDICINE

## 2018-12-01 PROCEDURE — 80048 BASIC METABOLIC PNL TOTAL CA: CPT | Performed by: PHYSICIAN ASSISTANT

## 2018-12-01 PROCEDURE — 12000013 ZZH R&B PEDS

## 2018-12-01 PROCEDURE — 25000128 H RX IP 250 OP 636: Performed by: PHYSICIAN ASSISTANT

## 2018-12-01 PROCEDURE — 99231 SBSQ HOSP IP/OBS SF/LOW 25: CPT | Performed by: SURGERY

## 2018-12-01 PROCEDURE — 85025 COMPLETE CBC W/AUTO DIFF WBC: CPT | Performed by: PHYSICIAN ASSISTANT

## 2018-12-01 PROCEDURE — 36415 COLL VENOUS BLD VENIPUNCTURE: CPT | Performed by: PHYSICIAN ASSISTANT

## 2018-12-01 PROCEDURE — 25000132 ZZH RX MED GY IP 250 OP 250 PS 637: Performed by: PHYSICIAN ASSISTANT

## 2018-12-01 PROCEDURE — 99231 SBSQ HOSP IP/OBS SF/LOW 25: CPT | Performed by: INTERNAL MEDICINE

## 2018-12-01 RX ADMIN — HYDROMORPHONE HYDROCHLORIDE 0.5 MG: 1 INJECTION, SOLUTION INTRAMUSCULAR; INTRAVENOUS; SUBCUTANEOUS at 09:36

## 2018-12-01 RX ADMIN — TAZOBACTAM SODIUM AND PIPERACILLIN SODIUM 3.38 G: 375; 3 INJECTION, SOLUTION INTRAVENOUS at 03:42

## 2018-12-01 RX ADMIN — TAZOBACTAM SODIUM AND PIPERACILLIN SODIUM 3.38 G: 375; 3 INJECTION, SOLUTION INTRAVENOUS at 21:55

## 2018-12-01 RX ADMIN — ACETAMINOPHEN 650 MG: 325 TABLET, FILM COATED ORAL at 09:37

## 2018-12-01 RX ADMIN — TAZOBACTAM SODIUM AND PIPERACILLIN SODIUM 3.38 G: 375; 3 INJECTION, SOLUTION INTRAVENOUS at 16:00

## 2018-12-01 RX ADMIN — HYDROMORPHONE HYDROCHLORIDE 0.5 MG: 1 INJECTION, SOLUTION INTRAMUSCULAR; INTRAVENOUS; SUBCUTANEOUS at 03:46

## 2018-12-01 RX ADMIN — HYDROMORPHONE HYDROCHLORIDE 0.3 MG: 1 INJECTION, SOLUTION INTRAMUSCULAR; INTRAVENOUS; SUBCUTANEOUS at 19:39

## 2018-12-01 RX ADMIN — TAZOBACTAM SODIUM AND PIPERACILLIN SODIUM 3.38 G: 375; 3 INJECTION, SOLUTION INTRAVENOUS at 09:36

## 2018-12-01 RX ADMIN — HYDROMORPHONE HYDROCHLORIDE 0.5 MG: 1 INJECTION, SOLUTION INTRAMUSCULAR; INTRAVENOUS; SUBCUTANEOUS at 00:31

## 2018-12-01 RX ADMIN — ONDANSETRON 4 MG: 2 SOLUTION INTRAMUSCULAR; INTRAVENOUS at 03:48

## 2018-12-01 NOTE — PROGRESS NOTES
[facility]  Hospitalist Progress Note  Thomas Cook MD 12/01/2018    Reason for Stay (Diagnosis): diverticulitis         Assessment and Plan:      Summary of Stay: Kin Pagan is a 38 year old male with no significant PMH, who presents with abdominal pain.  He was found to have diverticultis with microperforation on CT imaging    Pt is improving with IV antibiotic.  Pain is much improved     1. Acute diverticulitis with perforation - sx much improved with IV zosyn.  WBC has increased slightly.  Tolerating clears.  Recheck CBC in AM  2.  Mild HENRY - recheck BMP in AM.  Possibly contrast dye related     Prophylaxis - mechanical   Code status - Full Code  Dispo - home in next 1-2 days if continues to improve          Interval History (Subjective):      Feels much better today.  Pain decreased.  Tolerating clears                  Physical Exam:      Last Vital Signs:  /74  Pulse 80  Temp 98  F (36.7  C) (Oral)  Resp 16  Ht 1.829 m (6')  Wt 106.6 kg (235 lb)  SpO2 97%  BMI 31.87 kg/m2      Intake/Output Summary (Last 24 hours) at 12/01/18 1533  Last data filed at 12/01/18 1500   Gross per 24 hour   Intake             3230 ml   Output              875 ml   Net             2355 ml       Constitutional: Awake, alert, cooperative, no apparent distress.  Family present   Respiratory: Clear to auscultation bilaterally, no crackles or wheezing   Cardiovascular: Regular rate and rhythm, normal S1 and S2, and no murmur noted   Abdomen: Normal bowel sounds, soft, non-distended, non-tender   Skin: No rashes, no cyanosis, dry to touch   Neuro: Alert and oriented x3, no weakness, numbness, memory loss   Extremities: No edema, normal range of motion   Other(s):        All other systems: Negative          Medications:      All current medications were reviewed with changes reflected in problem list.         Data:      All new lab and imaging data was reviewed.   Labs:    Recent Labs  Lab  12/01/18  0702   WBC 15.5*   HGB 12.7*   HCT 38.8*   MCV 90         Imaging:   No results found for this or any previous visit (from the past 24 hour(s)).

## 2018-12-01 NOTE — PLAN OF CARE
Problem: Patient Care Overview  Goal: Individualization & Mutuality  Outcome: Improving  Kin stated he feels 65% better than yesterday.  Up showered.  Less pain.  Started on oral pain meds.  +bowel sounds.  Denied stool.  Tolerating clear diet.  Walking around room.

## 2018-12-01 NOTE — PLAN OF CARE
Problem: Bowel Obstruction (Adult)  Goal: Signs and Symptoms of Listed Potential Problems Will be Absent, Minimized or Managed (Bowel Obstruction)  Signs and symptoms of listed potential problems will be absent, minimized or managed by discharge/transition of care (reference Bowel Obstruction (Adult) CPG).   Outcome: Improving  Tmax 99.7.  Pt feeling chilled.  All other vitals stable.  Voiding.  Tolerating clear liquids.  Denies need for pain medication.  States rectal pressure is improved.  Receiving zosyn.  Will continue to monitor.

## 2018-12-01 NOTE — PLAN OF CARE
Problem: Patient Care Overview  Goal: Plan of Care/Patient Progress Review  Outcome: No Change  VSS. Abdominal and rectum pain controlled with PRN dilaudid. Hypoactive BS. Flatus present. No BM overnight. Remains NPO. Zofran x1 for nausea. PIV infusing. Ambulating to bathroom. Continue IV zosyn and bowel rest. Will continue to monitor.

## 2018-12-01 NOTE — PROGRESS NOTES
Surgery-[place]  C/S for diverticulitis with contained perforation (mesentery)  CT and labs reviewed.  Feels much better than at admit, pain improved, able to move with less discomfort, no nausea, is having flatus. Some cramps before having flatus.  Tmax 99.4 P80  WBC 15.5K this AM  NAD, comfortable  Abd soft, non-distended, focal tenderness in LLQ and in suprapubic region, no guarding, bowel tones present  Improving despite leukocytosis  Okay for clear liquids  Continue abx  Will need total of 2 weeks of antibiotic treatment  Discussed context of elective surgery  Might need colonoscopy once recovered but will defer to initial consulting surgeon.

## 2018-12-02 VITALS
RESPIRATION RATE: 18 BRPM | OXYGEN SATURATION: 100 % | HEART RATE: 80 BPM | TEMPERATURE: 98.1 F | WEIGHT: 235 LBS | DIASTOLIC BLOOD PRESSURE: 74 MMHG | SYSTOLIC BLOOD PRESSURE: 122 MMHG | BODY MASS INDEX: 31.83 KG/M2 | HEIGHT: 72 IN

## 2018-12-02 LAB
ANION GAP SERPL CALCULATED.3IONS-SCNC: 5 MMOL/L (ref 3–14)
BASOPHILS # BLD AUTO: 0 10E9/L (ref 0–0.2)
BASOPHILS NFR BLD AUTO: 0 %
BUN SERPL-MCNC: 10 MG/DL (ref 7–30)
CALCIUM SERPL-MCNC: 8.4 MG/DL (ref 8.5–10.1)
CHLORIDE SERPL-SCNC: 105 MMOL/L (ref 94–109)
CO2 SERPL-SCNC: 29 MMOL/L (ref 20–32)
CREAT SERPL-MCNC: 1.41 MG/DL (ref 0.66–1.25)
DIFFERENTIAL METHOD BLD: ABNORMAL
EOSINOPHIL # BLD AUTO: 0.1 10E9/L (ref 0–0.7)
EOSINOPHIL NFR BLD AUTO: 1 %
ERYTHROCYTE [DISTWIDTH] IN BLOOD BY AUTOMATED COUNT: 13.8 % (ref 10–15)
GFR SERPL CREATININE-BSD FRML MDRD: 57 ML/MIN/1.7M2
GLUCOSE SERPL-MCNC: 97 MG/DL (ref 70–99)
HCT VFR BLD AUTO: 38.6 % (ref 40–53)
HGB BLD-MCNC: 12.8 G/DL (ref 13.3–17.7)
LYMPHOCYTES # BLD AUTO: 2.8 10E9/L (ref 0.8–5.3)
LYMPHOCYTES NFR BLD AUTO: 19 %
MCH RBC QN AUTO: 29.8 PG (ref 26.5–33)
MCHC RBC AUTO-ENTMCNC: 33.2 G/DL (ref 31.5–36.5)
MCV RBC AUTO: 90 FL (ref 78–100)
METAMYELOCYTES # BLD: 0.1 10E9/L
METAMYELOCYTES NFR BLD MANUAL: 1 %
MONOCYTES # BLD AUTO: 1.2 10E9/L (ref 0–1.3)
MONOCYTES NFR BLD AUTO: 8 %
MYELOCYTES # BLD: 0.1 10E9/L
MYELOCYTES NFR BLD MANUAL: 1 %
NEUTROPHILS # BLD AUTO: 10.3 10E9/L (ref 1.6–8.3)
NEUTROPHILS NFR BLD AUTO: 70 %
PLATELET # BLD AUTO: 234 10E9/L (ref 150–450)
PLATELET # BLD EST: ABNORMAL 10*3/UL
POTASSIUM SERPL-SCNC: 3.5 MMOL/L (ref 3.4–5.3)
RBC # BLD AUTO: 4.29 10E12/L (ref 4.4–5.9)
RBC MORPH BLD: ABNORMAL
SODIUM SERPL-SCNC: 139 MMOL/L (ref 133–144)
WBC # BLD AUTO: 14.7 10E9/L (ref 4–11)

## 2018-12-02 PROCEDURE — 99238 HOSP IP/OBS DSCHRG MGMT 30/<: CPT | Performed by: INTERNAL MEDICINE

## 2018-12-02 PROCEDURE — 25000132 ZZH RX MED GY IP 250 OP 250 PS 637: Performed by: PHYSICIAN ASSISTANT

## 2018-12-02 PROCEDURE — 36415 COLL VENOUS BLD VENIPUNCTURE: CPT | Performed by: INTERNAL MEDICINE

## 2018-12-02 PROCEDURE — 25000128 H RX IP 250 OP 636: Performed by: PHYSICIAN ASSISTANT

## 2018-12-02 PROCEDURE — 85025 COMPLETE CBC W/AUTO DIFF WBC: CPT | Performed by: INTERNAL MEDICINE

## 2018-12-02 PROCEDURE — 80048 BASIC METABOLIC PNL TOTAL CA: CPT | Performed by: INTERNAL MEDICINE

## 2018-12-02 PROCEDURE — 99231 SBSQ HOSP IP/OBS SF/LOW 25: CPT | Performed by: SURGERY

## 2018-12-02 RX ADMIN — Medication 1 MG: at 01:45

## 2018-12-02 RX ADMIN — TAZOBACTAM SODIUM AND PIPERACILLIN SODIUM 3.38 G: 375; 3 INJECTION, SOLUTION INTRAVENOUS at 09:39

## 2018-12-02 RX ADMIN — TAZOBACTAM SODIUM AND PIPERACILLIN SODIUM 3.38 G: 375; 3 INJECTION, SOLUTION INTRAVENOUS at 03:56

## 2018-12-02 NOTE — PROGRESS NOTES
Surgery-[place]  C/S for diverticulitis with microperforation  Feels vastly better, no pain at rest, little pressure in his bottom when sitting upright. Having flatus and stools. Tolerated liquids and is hungry.  WBC 14.7K, Cr 1.41  Tmax 99.1  NAD, comfortable  Abd soft, non-distended, minimal LLQ tenderness without guarding, bowel tones present and normo-active  Improving, LAWRENCE still evident though likely at plateau  Okay for low residue diet  Continue abx, should get a total of two weeks  Seems reasonable for discharge though may need another day to trend creatinine

## 2018-12-02 NOTE — PLAN OF CARE
Problem: Patient Care Overview  Goal: Plan of Care/Patient Progress Review  Pt alert and oriented - up independently in room.  IV saline locked - IV zosyn abx.   Pt denies pain - states some pressure when in bottom when sitting up in chair.   BM this morning.   Advanced diet from clears to low fiber - tolerating well.   Plan to discharge home today.     AVS reviewed with patient. Medication filled by discharge pharmacy for patient to take home. Pt discharged home with friend/family to provide transport. .

## 2018-12-02 NOTE — PLAN OF CARE
Problem: Patient Care Overview  Goal: Plan of Care/Patient Progress Review  Outcome: Improving  VSS. Abdominal and rectum pain 5/10. Dilaudid x1 with relief. BS active. Flatus present. No BM overnight. Voiding. Tolerating clear liquids. Denies nausea. PIV SL. Continuing zosyn q6h. Labs to be redrawn this AM. Will continue to monitor.

## 2018-12-02 NOTE — PROGRESS NOTES
Pt feels well.  Tolerating diet.  When I saw him he was up mobilizing in his room.  Wants to go home today.  Creatinine mildly elevated but stable; he may have some element of mild CKD.  I recommended he establish care with a primary MD and have this rechecked as an outpatient; no urgency to this however    Home today.  F/u with surgery as outpt

## 2018-12-04 NOTE — DISCHARGE SUMMARY
Admit Date:     11/30/2018   Discharge Date:     12/02/2018      PRINCIPAL FINAL DIAGNOSES:     1.  Acute diverticulitis with microperforation.  Improved with conservative measures of this admission.  Given IV antibiotics.  General Surgery was consulted to assist, but no operative intervention was required.   2.  Mild renal insufficiency, unclear if acute or chronic.  Creatinine stabilized near 1.4.  Recommend followup with primary MD to recheck.        PRINCIPAL PROCEDURES THIS ADMISSION:   1.  Abdominal pelvic CT scan showing diverticulitis with probable diverticular rupture with free air in the adjacent mesentery.   2.  IV antibiotics from General Surgery consultation.      REASON FOR ADMISSION:  Please see dictated history and physical.  In brief, Mr. Pagan is a 38-year-old male with no significant medical history who presented with abdominal pain.  The patient was found to have diverticulitis with microperforation on CT imaging.      HOSPITAL COURSE:   1.  Acute diverticulitis with microperforation:  Symptoms improved with IV Zosyn.  Pain essentially resolved by day of discharge.  Tolerating diet.  The patient was seen by General Surgery.  There was no need for operative intervention this admission given his improvement with antibiotic therapy.  Plan is for the patient to follow up with General Surgery as an outpatient for consideration of elective partial colectomy in the future for diverticulitis prevention   2.  Mild renal insufficiency:  Unclear if acute or chronic.  He has no known history of chronic kidney disease, but admittedly does not follow up with a primary care provider.  Creatinine peaked at 1.4 with creatinine on admission near 1.2.  I did recommend followup with primary care provider for recheck of labs when he does establish care with a primary physician, which was recommended to the patient.      DISCHARGE MEDICATIONS:   1.  Augmentin 1 tablet twice a day for 12 days.   2.  Albuterol as  needed.      DISCHARGE INSTRUCTIONS:   1.  Recommend followup with Dr. Logan of General Surgery in 2-3 weeks to discuss possible future surgery for prevention of recurrent diverticulitis.  The patient given phone number to make an appointment.   2.  Recommend that you establish care with a primary MD and have a yearly checkup.   3.  We note that your kidney function is mildly abnormal this admission.  It is possible it is related to your infection and will improve.  It is also possible that you have some mild chronic kidney dysfunction.  Recommend that you have this rechecked when you establish care with a primary MD.      I saw and examined the patient on day of discharge.         MARK ANTHONY JACKSON MD             D: 2018   T: 2018   MT: PILAR      Name:     RUPINDER HORN   MRN:      [id number]        Account:        [id number]   :      1980           Admit Date:     2018                                  Discharge Date: 2018      Document: [id number]       cc: [facility]

## 2022-06-21 ENCOUNTER — HOSPITAL ENCOUNTER (EMERGENCY)
Facility: CLINIC | Age: 42
Discharge: LEFT BEFORE DISCHARGE | End: 2022-06-21

## 2022-06-21 VITALS
HEART RATE: 59 BPM | RESPIRATION RATE: 18 BRPM | SYSTOLIC BLOOD PRESSURE: 130 MMHG | TEMPERATURE: 98.2 F | DIASTOLIC BLOOD PRESSURE: 90 MMHG | OXYGEN SATURATION: 100 %

## 2022-06-21 NOTE — ED TRIAGE NOTES
Patient presents to the ED reporting left upper back pain that began last night. Denies known injury. States is worse with muscle movements and deep inspiration.

## 2023-08-02 ENCOUNTER — NURSE TRIAGE (OUTPATIENT)
Dept: NURSING | Facility: CLINIC | Age: 43
End: 2023-08-02

## 2023-08-02 NOTE — TELEPHONE ENCOUNTER
"Patient is calling and states that on Last Sunday, July 30 th started having chest pain.  Patient feels pain when he takes a deep breath.  This morning a \"pressure in chest is present\".  When patient pushes on chest it hurts and feels like a \"lump\".  Patient states that he has a strong history of heart disease in family.  Patient states that he will go to ER.      Reason for Disposition   Chest pain lasting longer than 5 minutes and ANY of the following:* Over 44 years old* Over 30 years old and at least one cardiac risk factor (e.g., diabetes mellitus, high blood pressure, high cholesterol, smoker, or strong family history of heart disease)* History of heart disease (i.e., angina, heart attack, heart failure, bypass surgery, takes nitroglycerin)* Pain is crushing, pressure-like, or heavy    Additional Information   Negative: SEVERE difficulty breathing (e.g., struggling for each breath, speaks in single words)   Negative: Passed out (i.e., fainted, collapsed and was not responding)   Negative: Difficult to awaken or acting confused (e.g., disoriented, slurred speech)   Negative: Shock suspected (e.g., cold/pale/clammy skin, too weak to stand, low BP, rapid pulse)    Protocols used: Chest Pain-A-OH    "

## 2024-04-27 ENCOUNTER — NURSE TRIAGE (OUTPATIENT)
Dept: NURSING | Facility: CLINIC | Age: 44
End: 2024-04-27

## 2024-04-27 NOTE — TELEPHONE ENCOUNTER
"  Nurse Triage SBAR    Is this a 2nd Level Triage? NO    Situation: Blood in stool.    Background: Patient states he has been \"feeling different\". Reports he was Dx with Diverticulitis 5 or 6 years ago. He now is noticing blood in his stool.     Assessment: Reports feeling \"uncomfortable\" on his left abdominal area, that comes and goes. States it is not related to having BM. Also reports about 2 Tbls bright red blood with BM in toilet water, and back stool. Denies any dizziness at this time. Reports blood in stool twice yesterday. Has not had a BM today.    Protocol Recommended Disposition:   Go to ED Now    Recommendation: Recommendation is to go to ED now. Reviewed care advice and call back instructions. Patient plans to follow advice.     Not established with [facility] PCP.    Does the patient meet one of the following criteria for ADS visit consideration? No      Reason for Disposition   [1] MODERATE rectal bleeding (small blood clots, passing blood without stool, or toilet water turns red) AND [2] more than once a day    Additional Information   Negative: Shock suspected (e.g., cold/pale/clammy skin, too weak to stand, low BP, rapid pulse)   Negative: Difficult to awaken or acting confused (e.g., disoriented, slurred speech)   Negative: Passed out (i.e., lost consciousness, collapsed and was not responding)   Negative: [1] Vomiting AND [2] contains red blood or black (\"coffee ground\") material  (Exception: Few red streaks in vomit that only happened once.)   Negative: Sounds like a life-threatening emergency to the triager   Negative: Diarrhea is main symptom   Negative: Stool color other than brown or tan is main concern  (no bleeding and no melena)   Negative: SEVERE rectal bleeding (large blood clots; constant or on and off bleeding)   Negative: SEVERE dizziness (e.g., unable to stand, requires support to walk, feels like passing out now)    Protocols used: Rectal Bleeding-A-AH    "

## 2024-07-06 ENCOUNTER — HOSPITAL ENCOUNTER (EMERGENCY)
Facility: CLINIC | Age: 44
Discharge: HOME | End: 2024-07-06
Attending: EMERGENCY MEDICINE | Admitting: EMERGENCY MEDICINE

## 2024-07-06 VITALS
WEIGHT: 222.66 LBS | OXYGEN SATURATION: 99 % | DIASTOLIC BLOOD PRESSURE: 87 MMHG | HEART RATE: 69 BPM | BODY MASS INDEX: 30.16 KG/M2 | RESPIRATION RATE: 16 BRPM | TEMPERATURE: 97.9 F | SYSTOLIC BLOOD PRESSURE: 138 MMHG | HEIGHT: 72 IN

## 2024-07-06 DIAGNOSIS — R51.9 FACIAL PAIN: ICD-10-CM

## 2024-07-06 PROCEDURE — 250N000009 HC RX 250: Performed by: EMERGENCY MEDICINE

## 2024-07-06 PROCEDURE — 64400 NJX AA&/STRD TRIGEMINAL NRV: CPT | Performed by: EMERGENCY MEDICINE

## 2024-07-06 PROCEDURE — 250N000012 HC RX MED GY IP 250 OP 636 PS 637: Performed by: EMERGENCY MEDICINE

## 2024-07-06 PROCEDURE — 99284 EMERGENCY DEPT VISIT MOD MDM: CPT | Mod: 25 | Performed by: EMERGENCY MEDICINE

## 2024-07-06 PROCEDURE — 250N000013 HC RX MED GY IP 250 OP 250 PS 637: Performed by: EMERGENCY MEDICINE

## 2024-07-06 RX ORDER — CLINDAMYCIN HCL 150 MG
300 CAPSULE ORAL ONCE
Status: COMPLETED | OUTPATIENT
Start: 2024-07-06 | End: 2024-07-06

## 2024-07-06 RX ORDER — LIDOCAINE HYDROCHLORIDE AND EPINEPHRINE BITARTRATE 20; .01 MG/ML; MG/ML
INJECTION, SOLUTION SUBCUTANEOUS
Status: COMPLETED
Start: 2024-07-06 | End: 2024-07-06

## 2024-07-06 RX ORDER — HYDROCODONE BITARTRATE AND ACETAMINOPHEN 5; 325 MG/1; MG/1
1 TABLET ORAL EVERY 6 HOURS PRN
Qty: 10 TABLET | Refills: 0 | Status: SHIPPED | OUTPATIENT
Start: 2024-07-06

## 2024-07-06 RX ORDER — CLINDAMYCIN HCL 300 MG
300 CAPSULE ORAL 3 TIMES DAILY
Qty: 21 CAPSULE | Refills: 0 | Status: SHIPPED | OUTPATIENT
Start: 2024-07-06 | End: 2024-07-13

## 2024-07-06 RX ORDER — PREDNISONE 20 MG/1
TABLET ORAL
Qty: 10 TABLET | Refills: 0 | Status: SHIPPED | OUTPATIENT
Start: 2024-07-06

## 2024-07-06 RX ADMIN — DEXAMETHASONE 10 MG: 2 TABLET ORAL at 12:18

## 2024-07-06 RX ADMIN — CLINDAMYCIN HYDROCHLORIDE 300 MG: 150 CAPSULE ORAL at 12:18

## 2024-07-06 RX ADMIN — LIDOCAINE HYDROCHLORIDE AND EPINEPHRINE BITARTRATE: 20; .01 INJECTION, SOLUTION SUBCUTANEOUS at 12:18

## 2024-07-06 ASSESSMENT — COLUMBIA-SUICIDE SEVERITY RATING SCALE - C-SSRS
6. HAVE YOU EVER DONE ANYTHING, STARTED TO DO ANYTHING, OR PREPARED TO DO ANYTHING TO END YOUR LIFE?: NO
2. HAVE YOU ACTUALLY HAD ANY THOUGHTS OF KILLING YOURSELF IN THE PAST MONTH?: NO
1. IN THE PAST MONTH, HAVE YOU WISHED YOU WERE DEAD OR WISHED YOU COULD GO TO SLEEP AND NOT WAKE UP?: NO

## 2024-07-06 ASSESSMENT — ACTIVITIES OF DAILY LIVING (ADL): ADLS_ACUITY_SCORE: 33

## 2024-07-06 NOTE — ED TRIAGE NOTES
Patient reporting tooth ache for the last 4 days, patient has not contacted a dentist. Took tylenol and ibuprofen PTA with no relief.

## 2024-07-06 NOTE — ED PROVIDER NOTES
Emergency Department Note      History of Present Illness     Chief Complaint   Dental Pain      HPI   Kin Pagan is a 44 year old male who presents for evaluation of dental pain. The patient states years ago he chipped two teeth on the left side of the mouth, one on the top and one on the bottom. He states there was no problem with pain until a few of days ago. He states water improves the pain. Took tylenol and ibuprofen with no relief.    Independent Historian   None    Review of External Notes       Past Medical History     Medical History and Problem List   Past Medical History:   Diagnosis Date    Allergic state     Anxiety        Medications   albuterol (2.5 MG/3ML) 0.083% nebulizer solution        Surgical History   No past surgical history on file.    Physical Exam     Patient Vitals for the past 24 hrs:   BP Temp Pulse Resp SpO2 Height Weight   07/06/24 1137 (!) 141/79 97.9  F (36.6  C) 65 16 99 % 1.829 m (6') 101 kg (222 lb 10.6 oz)     Physical Exam  Vitals reviewed.   HENT:      Right Ear: Tympanic membrane normal.      Left Ear: Tympanic membrane normal.      Nose: Nose normal.      Mouth/Throat:      Mouth: Mucous membranes are moist.      Comments: There is very poor dentition.  There are multiple dental caries there is an impacted left upper molar as well as a decayed brown left upper molar.  There is a open cavity in the left lower mandible as well as a decayed tooth.  There is no abscess or fluctuance.  There is no sublingual swelling.  Cardiovascular:      Rate and Rhythm: Normal rate.   Pulmonary:      Effort: Pulmonary effort is normal.   Neurological:      Mental Status: He is alert.         Diagnostics     Lab Results   Labs Ordered and Resulted from Time of ED Arrival to Time of ED Departure - No data to display    Imaging   No orders to display       E    ED Course      Medications Administered   Medications - No data to display    Procedures   [facility]  Hospital    Dental Block    Date/Time: 7/6/2024 5:05 PM    Performed by: Christiano Donis MD  Authorized by: Christiano Donis MD    Risks, benefits and alternatives discussed.      INDICATIONS     Indications: dental pain      LOCATION     Block type:  Supraperiosteal    Supraperiosteal location:  Lower teeth and upper teeth    Upper teeth location:  15/KEYLA 2nd molar    Lower teeth location:  32/RL 3rd molar    PROCEDURE DETAILS     Syringe type:  Controlled syringe    Needle gauge:  27 G    Anesthetic injected:  Lidocaine 2% WITH epi    Injection procedure:  Anatomic landmarks identified    POST PROCEDURE DETAILS      Outcome:  Pain unchanged         Discussion of Management   None    ED Course   ED Course as of 07/06/24 1152   Sat Jul 06, 2024   1147 I obtained history and performed physical exam as noted above.        Optional/Additional Documentation  None    Medical Decision Making / Diagnosis     CMS Diagnoses:   and None    MIPS       None    MDM   Kin Pagan is a 44 year old male who presents with left facial pain.  This is likely due to dental origin but unclear.  Patient is chronic pain unable to relieve use after anesthesia.  Recommended short course of antibiotics and steroid anti-inflammatories as a bridge to follow-up with dentistry and was discharged home in stable condition.    Disposition   The patient was discharged.     Diagnosis     ICD-10-CM    1. Facial pain  R51.9            Discharge Medications   Discharge Medication List as of 7/6/2024 12:31 PM        START taking these medications    Details   clindamycin (CLEOCIN) 300 MG capsule Take 1 capsule (300 mg) by mouth 3 times daily for 7 days, Disp-21 capsule, R-0, Local Print      HYDROcodone-acetaminophen (NORCO) 5-325 MG tablet Take 1 tablet by mouth every 6 hours as needed for pain, Disp-10 tablet, R-0, Local Print      predniSONE (DELTASONE) 20 MG tablet Take two tablets (= 40mg) each day for 5 (five) days, Disp-10  tablet, R-0, Local Print               Scribe Disclosure:  I, Horace Sparks, am serving as a scribe at 11:52 AM on 7/6/2024 to document services personally performed by Christiano Donis MD based on my observations and the provider's statements to me.        Christiano Donis MD  07/06/24 2001

## 2024-07-16 ENCOUNTER — APPOINTMENT (OUTPATIENT)
Dept: CARDIOLOGY | Facility: CLINIC | Age: 44
DRG: 641 | End: 2024-07-16
Attending: STUDENT IN AN ORGANIZED HEALTH CARE EDUCATION/TRAINING PROGRAM
Payer: MEDICAID

## 2024-07-16 ENCOUNTER — HOSPITAL ENCOUNTER (INPATIENT)
Facility: CLINIC | Age: 44
LOS: 1 days | Discharge: HOME | DRG: 641 | End: 2024-07-17
Attending: STUDENT IN AN ORGANIZED HEALTH CARE EDUCATION/TRAINING PROGRAM | Admitting: INTERNAL MEDICINE
Payer: MEDICAID

## 2024-07-16 ENCOUNTER — APPOINTMENT (OUTPATIENT)
Dept: MRI IMAGING | Facility: CLINIC | Age: 44
DRG: 641 | End: 2024-07-16
Attending: STUDENT IN AN ORGANIZED HEALTH CARE EDUCATION/TRAINING PROGRAM

## 2024-07-16 ENCOUNTER — APPOINTMENT (OUTPATIENT)
Dept: CT IMAGING | Facility: CLINIC | Age: 44
DRG: 641 | End: 2024-07-16
Attending: STUDENT IN AN ORGANIZED HEALTH CARE EDUCATION/TRAINING PROGRAM

## 2024-07-16 DIAGNOSIS — R07.9 CHEST PAIN: ICD-10-CM

## 2024-07-16 DIAGNOSIS — R00.1 BRADYCARDIA: ICD-10-CM

## 2024-07-16 DIAGNOSIS — K86.9 PANCREATIC LESION: ICD-10-CM

## 2024-07-16 DIAGNOSIS — R10.9 ABDOMINAL PAIN: ICD-10-CM

## 2024-07-16 DIAGNOSIS — E87.6 HYPOKALEMIA: ICD-10-CM

## 2024-07-16 LAB
ALBUMIN SERPL BCG-MCNC: 4.2 G/DL (ref 3.5–5.2)
ALBUMIN UR-MCNC: NEGATIVE MG/DL
ALP SERPL-CCNC: 49 U/L (ref 40–150)
ALT SERPL W P-5'-P-CCNC: 44 U/L (ref 0–70)
AMPHETAMINES UR QL SCN: ABNORMAL
ANION GAP BLD CALCULATED.3IONS-SCNC: 15 MMOL/L (ref 3–14)
ANION GAP SERPL CALCULATED.3IONS-SCNC: 15 MMOL/L (ref 7–15)
APPEARANCE UR: CLEAR
AST SERPL W P-5'-P-CCNC: 24 U/L (ref 0–45)
ATRIAL RATE - MUSE: 40 BPM
ATRIAL RATE - MUSE: 82 BPM
BARBITURATES UR QL SCN: ABNORMAL
BASOPHILS # BLD MANUAL: 0 10E3/UL (ref 0–0.2)
BASOPHILS NFR BLD MANUAL: 0 %
BENZODIAZ UR QL SCN: ABNORMAL
BILIRUB SERPL-MCNC: 0.2 MG/DL
BILIRUB UR QL STRIP: NEGATIVE
BUN SERPL-MCNC: 19.2 MG/DL (ref 6–20)
BUN SERPL-MCNC: 24 MG/DL (ref 7–30)
BZE UR QL SCN: ABNORMAL
CA-I BLD-MCNC: 4.8 MG/DL (ref 4.4–5.2)
CALCIUM SERPL-MCNC: 8.9 MG/DL (ref 8.8–10.4)
CANNABINOIDS UR QL SCN: ABNORMAL
CHLORIDE BLD-SCNC: 108 MMOL/L (ref 94–109)
CHLORIDE SERPL-SCNC: 107 MMOL/L (ref 98–107)
CO2 BLD-SCNC: 26 MMOL/L (ref 20–32)
COLOR UR AUTO: ABNORMAL
CREAT BLD-MCNC: 1.4 MG/DL (ref 0.7–1.3)
CREAT SERPL-MCNC: 1.4 MG/DL (ref 0.67–1.17)
DIASTOLIC BLOOD PRESSURE - MUSE: NORMAL MMHG
DIASTOLIC BLOOD PRESSURE - MUSE: NORMAL MMHG
EGFRCR SERPLBLD CKD-EPI 2021: 64 ML/MIN/1.73M2
EOSINOPHIL # BLD MANUAL: 0.4 10E3/UL (ref 0–0.7)
EOSINOPHIL NFR BLD MANUAL: 2 %
ERYTHROCYTE [DISTWIDTH] IN BLOOD BY AUTOMATED COUNT: 14 % (ref 10–15)
FENTANYL UR QL: ABNORMAL
FLUAV RNA SPEC QL NAA+PROBE: NEGATIVE
FLUBV RNA RESP QL NAA+PROBE: NEGATIVE
GLUCOSE BLD-MCNC: 108 MG/DL (ref 70–99)
GLUCOSE BLDC GLUCOMTR-MCNC: 95 MG/DL (ref 70–99)
GLUCOSE SERPL-MCNC: 124 MG/DL (ref 70–99)
GLUCOSE UR STRIP-MCNC: NEGATIVE MG/DL
HBA1C MFR BLD: 5.3 %
HCO3 SERPL-SCNC: 23 MMOL/L (ref 22–29)
HCT VFR BLD AUTO: 42.7 % (ref 40–53)
HCT VFR BLD CALC: 43 % (ref 40–53)
HGB BLD-MCNC: 11.7 G/DL (ref 13.3–17.7)
HGB BLD-MCNC: 12 G/DL (ref 13.3–17.7)
HGB BLD-MCNC: 14 G/DL (ref 13.3–17.7)
HGB BLD-MCNC: 14.6 G/DL (ref 13.3–17.7)
HGB UR QL STRIP: NEGATIVE
HOLD SPECIMEN: NORMAL
HOLD SPECIMEN: NORMAL
INTERPRETATION ECG - MUSE: NORMAL
INTERPRETATION ECG - MUSE: NORMAL
KETONES UR STRIP-MCNC: NEGATIVE MG/DL
LACTATE SERPL-SCNC: 1.4 MMOL/L (ref 0.7–2)
LEUKOCYTE ESTERASE UR QL STRIP: NEGATIVE
LIPASE SERPL-CCNC: 39 U/L (ref 13–60)
LVEF ECHO: NORMAL
LYMPHOCYTES # BLD MANUAL: 6.5 10E3/UL (ref 0.8–5.3)
LYMPHOCYTES NFR BLD MANUAL: 34 %
MAGNESIUM SERPL-MCNC: 2.5 MG/DL (ref 1.7–2.3)
MAGNESIUM SERPL-MCNC: 2.5 MG/DL (ref 1.7–2.3)
MCH RBC QN AUTO: 30 PG (ref 26.5–33)
MCHC RBC AUTO-ENTMCNC: 32.8 G/DL (ref 31.5–36.5)
MCV RBC AUTO: 92 FL (ref 78–100)
MONOCYTES # BLD MANUAL: 1.2 10E3/UL (ref 0–1.3)
MONOCYTES NFR BLD MANUAL: 6 %
MUCOUS THREADS #/AREA URNS LPF: PRESENT /LPF
MYELOCYTES # BLD MANUAL: 0.2 10E3/UL
MYELOCYTES NFR BLD MANUAL: 1 %
NEUTROPHILS # BLD MANUAL: 10.9 10E3/UL (ref 1.6–8.3)
NEUTROPHILS NFR BLD MANUAL: 57 %
NITRATE UR QL: NEGATIVE
NRBC # BLD AUTO: 0 10E3/UL
NRBC BLD AUTO-RTO: 0 /100
OPIATES UR QL SCN: ABNORMAL
P AXIS - MUSE: 62 DEGREES
P AXIS - MUSE: 63 DEGREES
PCP QUAL URINE (ROCHE): ABNORMAL
PH UR STRIP: 8 [PH] (ref 5–7)
PHOSPHATE SERPL-MCNC: 2.7 MG/DL (ref 2.5–4.5)
PLAT MORPH BLD: ABNORMAL
PLATELET # BLD AUTO: 182 10E3/UL (ref 150–450)
POTASSIUM BLD-SCNC: 3.3 MMOL/L (ref 3.4–5.3)
POTASSIUM SERPL-SCNC: 2.8 MMOL/L (ref 3.4–5.3)
POTASSIUM SERPL-SCNC: 3.9 MMOL/L (ref 3.4–5.3)
PR INTERVAL - MUSE: 194 MS
PR INTERVAL - MUSE: 232 MS
PROCALCITONIN SERPL IA-MCNC: 0.05 NG/ML
PROT SERPL-MCNC: 6.9 G/DL (ref 6.4–8.3)
QRS DURATION - MUSE: 94 MS
QRS DURATION - MUSE: 98 MS
QT - MUSE: 476 MS
QT - MUSE: 530 MS
QTC - MUSE: 431 MS
QTC - MUSE: 556 MS
R AXIS - MUSE: 27 DEGREES
R AXIS - MUSE: 38 DEGREES
RBC # BLD AUTO: 4.66 10E6/UL (ref 4.4–5.9)
RBC MORPH BLD: ABNORMAL
RBC URINE: 1 /HPF
RSV RNA SPEC NAA+PROBE: NEGATIVE
SARS-COV-2 RNA RESP QL NAA+PROBE: NEGATIVE
SODIUM BLD-SCNC: 145 MMOL/L (ref 135–145)
SODIUM SERPL-SCNC: 145 MMOL/L (ref 135–145)
SP GR UR STRIP: 1.03 (ref 1–1.03)
SYSTOLIC BLOOD PRESSURE - MUSE: NORMAL MMHG
SYSTOLIC BLOOD PRESSURE - MUSE: NORMAL MMHG
T AXIS - MUSE: 32 DEGREES
T AXIS - MUSE: 52 DEGREES
TROPONIN T SERPL HS-MCNC: <6 NG/L
TROPONIN T SERPL HS-MCNC: <6 NG/L
UROBILINOGEN UR STRIP-MCNC: NORMAL MG/DL
VENTRICULAR RATE- MUSE: 40 BPM
VENTRICULAR RATE- MUSE: 82 BPM
WBC # BLD AUTO: 19.2 10E3/UL (ref 4–11)
WBC URINE: 5 /HPF

## 2024-07-16 PROCEDURE — 87040 BLOOD CULTURE FOR BACTERIA: CPT | Performed by: STUDENT IN AN ORGANIZED HEALTH CARE EDUCATION/TRAINING PROGRAM

## 2024-07-16 PROCEDURE — 250N000011 HC RX IP 250 OP 636: Performed by: INTERNAL MEDICINE

## 2024-07-16 PROCEDURE — 84100 ASSAY OF PHOSPHORUS: CPT | Performed by: INTERNAL MEDICINE

## 2024-07-16 PROCEDURE — 84484 ASSAY OF TROPONIN QUANT: CPT | Performed by: STUDENT IN AN ORGANIZED HEALTH CARE EDUCATION/TRAINING PROGRAM

## 2024-07-16 PROCEDURE — 250N000011 HC RX IP 250 OP 636: Performed by: STUDENT IN AN ORGANIZED HEALTH CARE EDUCATION/TRAINING PROGRAM

## 2024-07-16 PROCEDURE — 96365 THER/PROPH/DIAG IV INF INIT: CPT | Mod: 59

## 2024-07-16 PROCEDURE — 85027 COMPLETE CBC AUTOMATED: CPT | Performed by: STUDENT IN AN ORGANIZED HEALTH CARE EDUCATION/TRAINING PROGRAM

## 2024-07-16 PROCEDURE — 80047 BASIC METABLC PNL IONIZED CA: CPT

## 2024-07-16 PROCEDURE — 87476 LYME DIS DNA AMP PROBE: CPT | Performed by: INTERNAL MEDICINE

## 2024-07-16 PROCEDURE — 36415 COLL VENOUS BLD VENIPUNCTURE: CPT | Performed by: PHYSICIAN ASSISTANT

## 2024-07-16 PROCEDURE — 36415 COLL VENOUS BLD VENIPUNCTURE: CPT | Performed by: STUDENT IN AN ORGANIZED HEALTH CARE EDUCATION/TRAINING PROGRAM

## 2024-07-16 PROCEDURE — 96375 TX/PRO/DX INJ NEW DRUG ADDON: CPT

## 2024-07-16 PROCEDURE — 250N000011 HC RX IP 250 OP 636: Performed by: PHYSICIAN ASSISTANT

## 2024-07-16 PROCEDURE — 258N000003 HC RX IP 258 OP 636: Performed by: PHYSICIAN ASSISTANT

## 2024-07-16 PROCEDURE — 200N000001 HC R&B ICU

## 2024-07-16 PROCEDURE — 80307 DRUG TEST PRSMV CHEM ANLYZR: CPT | Performed by: STUDENT IN AN ORGANIZED HEALTH CARE EDUCATION/TRAINING PROGRAM

## 2024-07-16 PROCEDURE — 80053 COMPREHEN METABOLIC PANEL: CPT | Performed by: STUDENT IN AN ORGANIZED HEALTH CARE EDUCATION/TRAINING PROGRAM

## 2024-07-16 PROCEDURE — 250N000013 HC RX MED GY IP 250 OP 250 PS 637: Performed by: INTERNAL MEDICINE

## 2024-07-16 PROCEDURE — 83735 ASSAY OF MAGNESIUM: CPT | Performed by: INTERNAL MEDICINE

## 2024-07-16 PROCEDURE — 85007 BL SMEAR W/DIFF WBC COUNT: CPT | Performed by: STUDENT IN AN ORGANIZED HEALTH CARE EDUCATION/TRAINING PROGRAM

## 2024-07-16 PROCEDURE — 93306 TTE W/DOPPLER COMPLETE: CPT | Mod: 26 | Performed by: INTERNAL MEDICINE

## 2024-07-16 PROCEDURE — 80061 LIPID PANEL: CPT | Performed by: INTERNAL MEDICINE

## 2024-07-16 PROCEDURE — 83690 ASSAY OF LIPASE: CPT | Performed by: STUDENT IN AN ORGANIZED HEALTH CARE EDUCATION/TRAINING PROGRAM

## 2024-07-16 PROCEDURE — A9585 GADOBUTROL INJECTION: HCPCS | Performed by: INTERNAL MEDICINE

## 2024-07-16 PROCEDURE — 255N000002 HC RX 255 OP 636: Performed by: INTERNAL MEDICINE

## 2024-07-16 PROCEDURE — 96367 TX/PROPH/DG ADDL SEQ IV INF: CPT

## 2024-07-16 PROCEDURE — 84145 PROCALCITONIN (PCT): CPT | Performed by: STUDENT IN AN ORGANIZED HEALTH CARE EDUCATION/TRAINING PROGRAM

## 2024-07-16 PROCEDURE — 83605 ASSAY OF LACTIC ACID: CPT | Performed by: STUDENT IN AN ORGANIZED HEALTH CARE EDUCATION/TRAINING PROGRAM

## 2024-07-16 PROCEDURE — 81003 URINALYSIS AUTO W/O SCOPE: CPT | Performed by: STUDENT IN AN ORGANIZED HEALTH CARE EDUCATION/TRAINING PROGRAM

## 2024-07-16 PROCEDURE — 258N000003 HC RX IP 258 OP 636: Performed by: STUDENT IN AN ORGANIZED HEALTH CARE EDUCATION/TRAINING PROGRAM

## 2024-07-16 PROCEDURE — 250N000009 HC RX 250: Performed by: STUDENT IN AN ORGANIZED HEALTH CARE EDUCATION/TRAINING PROGRAM

## 2024-07-16 PROCEDURE — 99207 PR APP CREDIT; MD BILLING SHARED VISIT: CPT | Performed by: PHYSICIAN ASSISTANT

## 2024-07-16 PROCEDURE — 74183 MRI ABD W/O CNTR FLWD CNTR: CPT

## 2024-07-16 PROCEDURE — 999N000208 ECHOCARDIOGRAM COMPLETE

## 2024-07-16 PROCEDURE — 83036 HEMOGLOBIN GLYCOSYLATED A1C: CPT | Performed by: INTERNAL MEDICINE

## 2024-07-16 PROCEDURE — 84132 ASSAY OF SERUM POTASSIUM: CPT | Performed by: INTERNAL MEDICINE

## 2024-07-16 PROCEDURE — 71260 CT THORAX DX C+: CPT

## 2024-07-16 PROCEDURE — 36415 COLL VENOUS BLD VENIPUNCTURE: CPT | Performed by: INTERNAL MEDICINE

## 2024-07-16 PROCEDURE — 99222 1ST HOSP IP/OBS MODERATE 55: CPT | Performed by: INTERNAL MEDICINE

## 2024-07-16 PROCEDURE — 255N000002 HC RX 255 OP 636: Performed by: STUDENT IN AN ORGANIZED HEALTH CARE EDUCATION/TRAINING PROGRAM

## 2024-07-16 PROCEDURE — 99285 EMERGENCY DEPT VISIT HI MDM: CPT | Mod: 25

## 2024-07-16 PROCEDURE — 87637 SARSCOV2&INF A&B&RSV AMP PRB: CPT | Performed by: STUDENT IN AN ORGANIZED HEALTH CARE EDUCATION/TRAINING PROGRAM

## 2024-07-16 PROCEDURE — 83735 ASSAY OF MAGNESIUM: CPT | Performed by: STUDENT IN AN ORGANIZED HEALTH CARE EDUCATION/TRAINING PROGRAM

## 2024-07-16 PROCEDURE — 93005 ELECTROCARDIOGRAM TRACING: CPT

## 2024-07-16 PROCEDURE — 99222 1ST HOSP IP/OBS MODERATE 55: CPT | Mod: FS | Performed by: INTERNAL MEDICINE

## 2024-07-16 PROCEDURE — 85018 HEMOGLOBIN: CPT | Performed by: PHYSICIAN ASSISTANT

## 2024-07-16 RX ORDER — CLINDAMYCIN HYDROCHLORIDE 300 MG/1
300 CAPSULE ORAL 3 TIMES DAILY
COMMUNITY

## 2024-07-16 RX ORDER — ACETAMINOPHEN 325 MG/1
650 TABLET ORAL EVERY 4 HOURS PRN
Status: DISCONTINUED | OUTPATIENT
Start: 2024-07-16 | End: 2024-07-17 | Stop reason: HOSPADM

## 2024-07-16 RX ORDER — ONDANSETRON 2 MG/ML
4 INJECTION INTRAMUSCULAR; INTRAVENOUS EVERY 6 HOURS PRN
Status: DISCONTINUED | OUTPATIENT
Start: 2024-07-16 | End: 2024-07-16

## 2024-07-16 RX ORDER — PREDNISONE 20 MG/1
40 TABLET ORAL DAILY
COMMUNITY

## 2024-07-16 RX ORDER — POTASSIUM CHLORIDE 7.45 MG/ML
10 INJECTION INTRAVENOUS
Status: COMPLETED | OUTPATIENT
Start: 2024-07-16 | End: 2024-07-16

## 2024-07-16 RX ORDER — ATROPINE SULFATE 0.1 MG/ML
1 INJECTION INTRAVENOUS ONCE
Status: COMPLETED | OUTPATIENT
Start: 2024-07-16 | End: 2024-07-16

## 2024-07-16 RX ORDER — GADOBUTROL 604.72 MG/ML
0.1 INJECTION INTRAVENOUS ONCE
Status: COMPLETED | OUTPATIENT
Start: 2024-07-16 | End: 2024-07-16

## 2024-07-16 RX ORDER — AMOXICILLIN 250 MG
1 CAPSULE ORAL 2 TIMES DAILY PRN
Status: DISCONTINUED | OUTPATIENT
Start: 2024-07-16 | End: 2024-07-17 | Stop reason: HOSPADM

## 2024-07-16 RX ORDER — HYDROMORPHONE HCL IN WATER/PF 6 MG/30 ML
0.4 PATIENT CONTROLLED ANALGESIA SYRINGE INTRAVENOUS
Status: DISCONTINUED | OUTPATIENT
Start: 2024-07-16 | End: 2024-07-17 | Stop reason: HOSPADM

## 2024-07-16 RX ORDER — LIDOCAINE 40 MG/G
CREAM TOPICAL
Status: DISCONTINUED | OUTPATIENT
Start: 2024-07-16 | End: 2024-07-17 | Stop reason: HOSPADM

## 2024-07-16 RX ORDER — IBUPROFEN 200 MG
800 TABLET ORAL EVERY 6 HOURS PRN
Status: ON HOLD | COMMUNITY
End: 2024-07-17

## 2024-07-16 RX ORDER — SODIUM CHLORIDE 9 MG/ML
INJECTION, SOLUTION INTRAVENOUS CONTINUOUS
Status: DISCONTINUED | OUTPATIENT
Start: 2024-07-16 | End: 2024-07-17 | Stop reason: HOSPADM

## 2024-07-16 RX ORDER — MAGNESIUM SULFATE HEPTAHYDRATE 40 MG/ML
2 INJECTION, SOLUTION INTRAVENOUS ONCE
Status: DISCONTINUED | OUTPATIENT
Start: 2024-07-16 | End: 2024-07-16

## 2024-07-16 RX ORDER — ONDANSETRON 4 MG/1
4 TABLET, ORALLY DISINTEGRATING ORAL EVERY 6 HOURS PRN
Status: DISCONTINUED | OUTPATIENT
Start: 2024-07-16 | End: 2024-07-16

## 2024-07-16 RX ORDER — MAGNESIUM SULFATE HEPTAHYDRATE 40 MG/ML
2 INJECTION, SOLUTION INTRAVENOUS ONCE
Status: COMPLETED | OUTPATIENT
Start: 2024-07-16 | End: 2024-07-16

## 2024-07-16 RX ORDER — AMOXICILLIN 250 MG
2 CAPSULE ORAL 2 TIMES DAILY PRN
Status: DISCONTINUED | OUTPATIENT
Start: 2024-07-16 | End: 2024-07-17 | Stop reason: HOSPADM

## 2024-07-16 RX ORDER — ATROPINE SULFATE 0.1 MG/ML
1 INJECTION INTRAVENOUS
Status: DISCONTINUED | OUTPATIENT
Start: 2024-07-16 | End: 2024-07-17 | Stop reason: HOSPADM

## 2024-07-16 RX ORDER — ATROPINE SULFATE 0.1 MG/ML
INJECTION INTRAVENOUS
Status: DISCONTINUED
Start: 2024-07-16 | End: 2024-07-16 | Stop reason: HOSPADM

## 2024-07-16 RX ORDER — CLINDAMYCIN HYDROCHLORIDE 300 MG/1
300 CAPSULE ORAL 3 TIMES DAILY
Status: DISCONTINUED | OUTPATIENT
Start: 2024-07-16 | End: 2024-07-17 | Stop reason: HOSPADM

## 2024-07-16 RX ORDER — ACETAMINOPHEN 500 MG
1000 TABLET ORAL EVERY 6 HOURS PRN
COMMUNITY

## 2024-07-16 RX ORDER — OXYCODONE HYDROCHLORIDE 5 MG/1
5 TABLET ORAL EVERY 4 HOURS PRN
Status: DISCONTINUED | OUTPATIENT
Start: 2024-07-16 | End: 2024-07-17 | Stop reason: HOSPADM

## 2024-07-16 RX ORDER — HYDROMORPHONE HCL IN WATER/PF 6 MG/30 ML
0.2 PATIENT CONTROLLED ANALGESIA SYRINGE INTRAVENOUS
Status: DISCONTINUED | OUTPATIENT
Start: 2024-07-16 | End: 2024-07-17 | Stop reason: HOSPADM

## 2024-07-16 RX ORDER — LIDOCAINE 40 MG/G
CREAM TOPICAL
Status: DISCONTINUED | OUTPATIENT
Start: 2024-07-16 | End: 2024-07-16

## 2024-07-16 RX ORDER — HYDROMORPHONE HYDROCHLORIDE 1 MG/ML
0.5 INJECTION, SOLUTION INTRAMUSCULAR; INTRAVENOUS; SUBCUTANEOUS ONCE
Status: COMPLETED | OUTPATIENT
Start: 2024-07-16 | End: 2024-07-16

## 2024-07-16 RX ORDER — IOPAMIDOL 755 MG/ML
500 INJECTION, SOLUTION INTRAVASCULAR ONCE
Status: COMPLETED | OUTPATIENT
Start: 2024-07-16 | End: 2024-07-16

## 2024-07-16 RX ADMIN — POTASSIUM CHLORIDE 10 MEQ: 7.46 INJECTION, SOLUTION INTRAVENOUS at 12:52

## 2024-07-16 RX ADMIN — CLINDAMYCIN HYDROCHLORIDE 300 MG: 300 CAPSULE ORAL at 21:00

## 2024-07-16 RX ADMIN — HYDROMORPHONE HYDROCHLORIDE 0.5 MG: 1 INJECTION, SOLUTION INTRAMUSCULAR; INTRAVENOUS; SUBCUTANEOUS at 10:35

## 2024-07-16 RX ADMIN — POTASSIUM CHLORIDE 10 MEQ: 7.46 INJECTION, SOLUTION INTRAVENOUS at 11:33

## 2024-07-16 RX ADMIN — IOPAMIDOL 72 ML: 755 INJECTION, SOLUTION INTRAVENOUS at 11:13

## 2024-07-16 RX ADMIN — POTASSIUM CHLORIDE 10 MEQ: 7.46 INJECTION, SOLUTION INTRAVENOUS at 16:59

## 2024-07-16 RX ADMIN — HUMAN ALBUMIN MICROSPHERES AND PERFLUTREN 4 ML: 10; .22 INJECTION, SOLUTION INTRAVENOUS at 13:18

## 2024-07-16 RX ADMIN — GADOBUTROL 10.43 ML: 604.72 INJECTION INTRAVENOUS at 14:52

## 2024-07-16 RX ADMIN — PANTOPRAZOLE SODIUM 40 MG: 40 INJECTION, POWDER, FOR SOLUTION INTRAVENOUS at 14:14

## 2024-07-16 RX ADMIN — SODIUM CHLORIDE, POTASSIUM CHLORIDE, SODIUM LACTATE AND CALCIUM CHLORIDE 1000 ML: 600; 310; 30; 20 INJECTION, SOLUTION INTRAVENOUS at 11:29

## 2024-07-16 RX ADMIN — SODIUM CHLORIDE: 9 INJECTION, SOLUTION INTRAVENOUS at 23:08

## 2024-07-16 RX ADMIN — MAGNESIUM SULFATE HEPTAHYDRATE 2 G: 40 INJECTION, SOLUTION INTRAVENOUS at 11:00

## 2024-07-16 RX ADMIN — SODIUM CHLORIDE: 9 INJECTION, SOLUTION INTRAVENOUS at 14:20

## 2024-07-16 RX ADMIN — SODIUM CHLORIDE 60 ML: 9 INJECTION, SOLUTION INTRAVENOUS at 11:13

## 2024-07-16 RX ADMIN — POTASSIUM CHLORIDE 10 MEQ: 7.46 INJECTION, SOLUTION INTRAVENOUS at 14:14

## 2024-07-16 RX ADMIN — ATROPINE SULFATE 1 MG: 0.1 INJECTION INTRAVENOUS at 10:49

## 2024-07-16 RX ADMIN — CLINDAMYCIN HYDROCHLORIDE 300 MG: 300 CAPSULE ORAL at 18:15

## 2024-07-16 ASSESSMENT — ACTIVITIES OF DAILY LIVING (ADL)
ADLS_ACUITY_SCORE: 35
ADLS_ACUITY_SCORE: 23
ADLS_ACUITY_SCORE: 35
ADLS_ACUITY_SCORE: 35
ADLS_ACUITY_SCORE: 38
ADLS_ACUITY_SCORE: 23
ADLS_ACUITY_SCORE: 23
ADLS_ACUITY_SCORE: 35
ADLS_ACUITY_SCORE: 35
ADLS_ACUITY_SCORE: 23
ADLS_ACUITY_SCORE: 35
ADLS_ACUITY_SCORE: 35
ADLS_ACUITY_SCORE: 23
ADLS_ACUITY_SCORE: 23

## 2024-07-16 ASSESSMENT — COLUMBIA-SUICIDE SEVERITY RATING SCALE - C-SSRS
1. IN THE PAST MONTH, HAVE YOU WISHED YOU WERE DEAD OR WISHED YOU COULD GO TO SLEEP AND NOT WAKE UP?: NO
6. HAVE YOU EVER DONE ANYTHING, STARTED TO DO ANYTHING, OR PREPARED TO DO ANYTHING TO END YOUR LIFE?: NO
2. HAVE YOU ACTUALLY HAD ANY THOUGHTS OF KILLING YOURSELF IN THE PAST MONTH?: NO

## 2024-07-16 NOTE — ED PROVIDER NOTES
Emergency Department Note      History of Present Illness     Chief Complaint   Abdominal Pain      HPI   Kin Bernardo is a 44 year old male with a history of diverticulitis who presents to the emergency department for evaluation of abdominal pain. He reports that earlier today he had a sudden onset of generalized abdominal pain and flank pain that is more severe in his right lower quadrant and right flank area. He rates this pain as a 10/10 in severity. He denies any chest pain at bedside, but told EMS that he had some mild chest pain en route to the ED. He does endorse difficulty breathing and feels that he is more short of breath compared to his baseline. He has not taken any medications to manage his pain. He also endorses hematochezia and states that this is bright red in color. Per EMS, he was bradycardic in the high 30s bpm en route without any cardiac history. He does have a history of diverticulitis. He denies any regular alcohol use, but does smoke marijuana on occasion without any today. Denies any suicidal ideation, thoughts of self harm, or homicidal ideation. He also notes that he has had a toothache for the past week.     Independent Historian   EMS as detailed above.    Review of External Notes   I reviewed the nurse triage note from 4/27/24 complaining of blood in stool. He had some discomfort in his left abdominal area that comes and goes, unrelated to bowel movements.    Past Medical History   Medical History and Problem List   Diverticulitis    Medications   None    Surgical History   None    Physical Exam     Patient Vitals for the past 24 hrs:   BP Temp Temp src Pulse Resp SpO2 Height Weight   07/16/24 1622 (!) 140/85 96.8  F (36  C) Temporal 55 11 100 % -- --   07/16/24 1600 118/77 -- -- 64 16 100 % -- --   07/16/24 1550 110/72 -- -- 61 -- 100 % -- --   07/16/24 1440 133/89 -- -- 58 -- 100 % -- --   07/16/24 1430 (!) 149/76 -- -- 61 -- 99 % -- --   07/16/24 1420 133/83 -- -- 66 -- 100 %  -- --   07/16/24 1410 136/83 -- -- 58 13 96 % -- --   07/16/24 1400 114/78 -- -- 56 20 99 % -- --   07/16/24 1350 -- 97.8  F (36.6  C) Oral -- -- -- -- --   07/16/24 1130 (!) 162/110 -- -- 91 -- 100 % -- --   07/16/24 1100 (!) 168/108 -- -- 102 -- 100 % -- --   07/16/24 1055 (!) 177/115 -- -- 97 -- 100 % -- --   07/16/24 1050 -- -- -- (!) 33 -- -- -- --   07/16/24 1045 (!) 177/99 -- -- (!) 33 -- 100 % -- --   07/16/24 1042 (!) 176/93 -- -- (!) 37 -- 100 % -- --   07/16/24 1040 (!) 184/97 -- -- (!) 36 -- 100 % -- --   07/16/24 1018 (!) 169/98 -- -- (!) 37 20 97 % -- --   07/16/24 1010 -- -- -- -- -- -- 1.829 m (6') 104.3 kg (230 lb)     Physical Exam  General: Patient holding right abdomen, reporting 10/10 pain. Cooperative with exam. Normal mentation.  Head:  Scalp is NC/AT  Eyes:  No scleral icterus, PERRL  ENT:  The external nose and ears are normal.   Neck:  Normal range of motion without rigidity.  CV:  Bradycardic, regular rhythm    No pathologic murmur   Resp:  Breath sounds are clear bilaterally    Non-labored, no retractions or accessory muscle use  GI:  Right sided abdominal pain to palpation. Bowel sounds present. Abdomen soft without guarding or distension.  MS:  No lower extremity edema   Skin:  Warm and dry, No rash or lesions noted.  Neuro:  Oriented x 3. No gross motor deficits.      Diagnostics     Lab Results   Labs Ordered and Resulted from Time of ED Arrival to Time of ED Departure   COMPREHENSIVE METABOLIC PANEL - Abnormal       Result Value    Sodium 145      Potassium 2.8 (*)     Carbon Dioxide (CO2) 23      Anion Gap 15      Urea Nitrogen 19.2      Creatinine 1.40 (*)     GFR Estimate 64      Calcium 8.9      Chloride 107      Glucose 124 (*)     Alkaline Phosphatase 49      AST 24      ALT 44      Protein Total 6.9      Albumin 4.2      Bilirubin Total 0.2     ROUTINE UA WITH MICROSCOPIC REFLEX TO CULTURE - Abnormal    Color Urine Straw      Appearance Urine Clear      Glucose Urine Negative       Bilirubin Urine Negative      Ketones Urine Negative      Specific Gravity Urine 1.034      Blood Urine Negative      pH Urine 8.0 (*)     Protein Albumin Urine Negative      Urobilinogen Urine Normal      Nitrite Urine Negative      Leukocyte Esterase Urine Negative      Mucus Urine Present (*)     RBC Urine 1      WBC Urine 5     CBC WITH PLATELETS AND DIFFERENTIAL - Abnormal    WBC Count 19.2 (*)     RBC Count 4.66      Hemoglobin 14.0      Hematocrit 42.7      MCV 92      MCH 30.0      MCHC 32.8      RDW 14.0      Platelet Count 182      NRBCs per 100 WBC 0      Absolute NRBCs 0.0     MANUAL DIFFERENTIAL - Abnormal    % Neutrophils 57      % Lymphocytes 34      % Monocytes 6      % Eosinophils 2      % Basophils 0      % Myelocytes 1      Absolute Neutrophils 10.9 (*)     Absolute Lymphocytes 6.5 (*)     Absolute Monocytes 1.2      Absolute Eosinophils 0.4      Absolute Basophils 0.0      Absolute Myelocytes 0.2 (*)     RBC Morphology Confirmed RBC Indices      Platelet Assessment        Value: Automated Count Confirmed. Platelet morphology is normal.   ISTAT BASIC CHEM ICA HEMATOCRIT POCT - Abnormal    Chloride POCT 108      Potassium POCT 3.3 (*)     Sodium POCT 145      UREA NITROGEN POCT 24      Calcium, Ionized Whole Blood POCT 4.8      Glucose Whole Blood POCT 108 (*)     Anion Gap POCT 15.0 (*)     Hemoglobin POCT 14.6      Hematocrit POCT 43      Creatinine POCT 1.4 (*)     TOTAL CO2 POCT 26     MAGNESIUM - Abnormal    Magnesium 2.5 (*)    URINE DRUG SCREEN PANEL - Abnormal    Amphetamines Urine Screen Negative      Barbituates Urine Screen Negative      Benzodiazepine Urine Screen Negative      Cannabinoids Urine Screen Positive (*)     Cocaine Urine Screen Negative      Fentanyl Qual Urine Screen Negative      Opiates Urine Screen Negative      PCP Urine Screen Negative     LIPASE - Normal    Lipase 39     TROPONIN T, HIGH SENSITIVITY - Normal    Troponin T, High Sensitivity <6     LACTIC ACID  WHOLE BLOOD - Normal    Lactic Acid 1.4     TROPONIN T, HIGH SENSITIVITY - Normal    Troponin T, High Sensitivity <6     INFLUENZA A/B, RSV, & SARS-COV2 PCR - Normal    Influenza A PCR Negative      Influenza B PCR Negative      RSV PCR Negative      SARS CoV2 PCR Negative     PROCALCITONIN - Normal    Procalcitonin 0.05     HEMOGLOBIN   GLUCOSE MONITOR NURSING POCT   LYME DISEASE DNA DETECTION BY PCR   BLOOD CULTURE   BLOOD CULTURE       Imaging   MR Abdomen MRCP w/o & w Contrast   Final Result   IMPRESSION:    Small cystic lesions of the pancreas suggesting cystic pancreatic   neoplasm such as intraductal papillary mucinous neoplasms. Largest is   7 mm. See below for imaging guidelines.   Enhancing nodule at the left liver has features suggestive of a small   hemangioma.   Suggestion of mild fatty liver.      REFERENCE:   Revisions of international consensus [place] guidelines for the   management of IPMN of the pancreas. Pancreatology 17(5):738-753.      Cysts smaller than 30 mm:   Largest cyst less than 10 mm: CT or MRI/MRCP in 6 months and then   every 2 years if no change   Largest cyst between 10-20 mm: CT or MRI/MRCP every 6 months for 1   year and then yearly for 2 years and then every 2 years if no change   Largest cyst between 20-30 mm: EUS in 3-6 months and then annual   surveillance alternating between MRI and EUS as appropriate            SHAKEEL BARBER MD            SYSTEM ID:  [id number]      Echocardiogram Complete   Final Result      CT Aortic Survey w Contrast   Final Result   IMPRESSION:   1.  No aortic aneurysm or dissection. No acute findings in the chest,   abdomen and pelvis.   2.  A 1.1 cm enhancing lesion in the left lobe of the liver,   indeterminate but likely a flash filling hemangioma. Consider   follow-up nonemergent MRI for further evaluation.   3.  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 Consider nonemergent MRCP.   4.   Colonic diverticulosis. Circumferential wall thickening of the   sigmoid colon, likely related to prior episodes of diverticulitis.   Consider follow-up colonoscopy.      CHRISTIANO CHAVEZ MD            SYSTEM ID:  [id number]          EKG   ECG taken at 1034, ECG read at 1040  Marked sinus bradycardia  Nonspecific ST abnormality  Abnormal ECG   Rate 40 bpm. CT interval 194 ms. QRS duration 98 ms. QT/QTc 530/431 ms. P-R-T axes 62 38 52.    Independent Interpretation   None    ED Course      Medications Administered   Medications   potassium chloride 10 mEq in 100 mL sterile water infusion (0 mEq Intravenous ED/Periop/Clinic Infusing on Admission/transfer 7/16/24 1559)   lidocaine 1 % 0.1-1 mL (has no administration in time range)   lidocaine (LMX4) cream (has no administration in time range)   sodium chloride (PF) 0.9% PF flush 3 mL (3 mLs Intracatheter $Given 7/16/24 1633)   sodium chloride (PF) 0.9% PF flush 3 mL (has no administration in time range)   senna-docusate (SENOKOT-S/PERICOLACE) 8.6-50 MG per tablet 1 tablet (has no administration in time range)     Or   senna-docusate (SENOKOT-S/PERICOLACE) 8.6-50 MG per tablet 2 tablet (has no administration in time range)   sodium chloride 0.9 % infusion (0 mLs Intravenous ED/Periop/Clinic Infusing on Admission/transfer 7/16/24 1559)   acetaminophen (TYLENOL) tablet 650 mg (has no administration in time range)   oxyCODONE IR (ROXICODONE) half-tab 2.5 mg (has no administration in time range)   oxyCODONE (ROXICODONE) tablet 5 mg (has no administration in time range)   HYDROmorphone (DILAUDID) injection 0.2 mg (has no administration in time range)   HYDROmorphone (DILAUDID) injection 0.4 mg (has no administration in time range)   pantoprazole (PROTONIX) IV push injection 40 mg (40 mg Intravenous $Given 7/16/24 1414)   lidocaine 1 % 0.1-1 mL (has no administration in time range)   sodium chloride (PF) 0.9% PF flush 3 mL (3 mLs Intracatheter Not Given 7/16/24 1636)   sodium  chloride (PF) 0.9% PF flush 3 mL (has no administration in time range)   HYDROmorphone (PF) (DILAUDID) injection 0.5 mg (0.5 mg Intravenous $Given 7/16/24 1035)   atropine injection 1 mg (1 mg Intravenous $Given 7/16/24 1049)   magnesium sulfate 2 g in 50 mL sterile water intermittent infusion (0 g Intravenous Stopped 7/16/24 1130)   iopamidol (ISOVUE-370) solution 500 mL (72 mLs Intravenous $Given 7/16/24 1113)   CT Scan Flush (60 mLs Intravenous $Given 7/16/24 1113)   lactated ringers BOLUS 1,000 mL (0 mLs Intravenous Stopped 7/16/24 1209)   perflutren diluted 1mL to 2mL with saline (OPTISON) diluted injection 4 mL (4 mLs Intravenous $Given 7/16/24 1318)   sodium chloride (PF) 0.9% PF flush 10 mL (10 mLs Intravenous $Given 7/16/24 1319)   gadobutrol (GADAVIST) injection 10.43 mL (10.43 mLs Intravenous $Given 7/16/24 1452)       Procedures   Procedures     Discussion of Management   Admitting Hospitalist,  Vivian ARANGO, for Dr. Jasmine  Cardiology, Dr. Lala    ED Course   ED Course as of 07/16/24 1640   Tue Jul 16, 2024   1009 I obtained history and examined the patient as noted above. I discussed plan for admission to the hospital.   1047 I staffed this patient with Dr. Bales.   1047 I obtained the history and examined the patient as above.    1126 I rechecked and updated the patient as above    1214 I rechecked the patient and updated. I discussed plan for admission to the hospital.     1229 I spoke with Dr. Lala from Cardiology.     1235 I rechecked the patient and updated.     1242 I spoke with Vivian ARANGO for Dr. Jasmine from Hospitalist Services, who accepts the patient for admission.         Optional/Additional Documentation  None    Medical Decision Making / Diagnosis     CMS Diagnoses: None    MIPS       None    MDM   Kin Bernardo is a 44 year old male presented abdominal pain, chest pain, noted to be bradycardic by EMS.  Patient reports that onset this morning.  Upon arrival to ER, patient  appears very uncomfortable, holding right abdomen.  He is also diaphoretic.  Heart rate noted to be in the 30s showing regular rhythm on the monitor.  EKG confirms sinus rhythm.  Considered numerous etiologies including but not limited to aortic dissection, ACS/LEXA, mesenteric ischemia, bowel obstruction, intra-abdominal perforation, among others.  Provide patient dose of atropine for ongoing bradycardia and heart rate improved into the 90s with normal sinus rhythm.  QT prolongation present on EKG, IV magnesium provided.  Patient remained on cardiac monitoring and has remained in normal sinus rhythm for duration of ER stay.  Labs for the most part are reassuring outside of mild hypokalemia along with leukocytosis, cause unknown.  Troponin not detectable x 2.  He was seen in the ER recently for dental concerns however on my examination, no obvious signs of dental infection and he denies any dental pain, dental caries noted.  CT aortic study completed with extension into abdomen/pelvis and is thankfully negative for aortic aneurysm or dissection.  1 cm enhancing lesion noted to left hepatic lobe as well as a 0.9 cm hypodense lesion to the pancreatic neck/body.  No other concerning findings noted.  Considered possibility of paraneoplastic syndrome in the setting of pancreatic lesion possibly contributing to bradycardia.  Also could consider hypokalemia contributing to this.  IV potassium replacement provided.  MRCP ordered for further evaluation of pancreatic lesion.  Consulted with inpatient cardiologist who recommends echo for further evaluation.  Patient will require observation admission for ongoing cardiac monitoring.  I then consulted with Vivian Valencia with inpatient hospitalist service who graciously except patient's admission for ongoing workup and cares.    Disposition   The patient was admitted to the hospital.     Diagnosis     ICD-10-CM    1. Bradycardia  R00.1 Zio Patch Mail Out      2. Abdominal pain   R10.9       3. Chest pain  R07.9       4. Hypokalemia  E87.6       5. Pancreatic lesion  K86.9            Discharge Medications   Current Discharge Medication List            Scribe Disclosure:  I, Tahmina Crowell, am serving as a scribe at 10:14 AM on 7/16/2024 to document services personally performed by Rhonda Ward PA-C based on my observations and the provider's statements to me.        Rhonda Ward PA-C  07/16/24 1640

## 2024-07-16 NOTE — CONSULTS
[facility]    Cardiology Consultation     Kin Pagan MRN#: [id number]   YOB: 1980 Age: 44 year old     Date of Admission:  7/16/2024    Consult Indication:  bradycardia     Assessment & Plan     # Sinus bradycardia, resolved with atropine, likely 2/2 severe hypokalemia   # Prolonged QTc, need to monitor and reassess after electrolyte replacement    -TTE formal read pending, on preliminary review, normal cardiac structure and function  - Agree with replacement of potassium to greater than 4, magnesium to greater than 2  - Avoid QT prolonging medications  - Suspect electrolyte derangements may be related, at least in part, to poor diet/nutrition  - Encouraged to cut back on cannabis use as much as able, ideally stop entirely  - Cardiac telemetry  - Zio patch monitor ordered to be mailed after discharge  - Cardiology will sign off      Please do not hesitate to page with any questions or concerns.     Tanmay Lala MD, [facility]  Cardiology  July 16, 2024    Voice recognition software utilized.   Moderate complexity     History of Present Illness     Patient is a 44-year-old male with past medical history significant for diverticulitis, CKD, regular cannabis use (smokes several times daily), who presents with abdominal pain, found to have significant electrolyte derangements, and sinus bradycardia.    Patient reports that he has had intermittent abdominal pain over the past several years, which he has attributed to diverticulitis.  He does note that his diet is quite poor due to financial strain and unstable housing situation, some days without any significant meals, other times with fast food, pending machines.  Earlier this morning he awoke with cramping abdominal pain.  He did note some associated nausea and emesis.  No chest pain, chest pressure, abnormal shortness of breath.  In the ED blood pressure was elevated at 184/97 mmHg.  ECG demonstrates sinus  bradycardia at 40 bpm.  QTc 530 ms.  Patient was administered atropine 1 mg IV.  This resulted in improvement of heart rates in the 80s beats per minute range.  Follow-up ECG normal sinus rhythm with first-degree AV block at 82 bpm, QTc 556 ms.  Labs notable for potassium 2.8, creatinine 1.4, magnesium 2.5.  High-sensitivity troponin negative.  Patient was administered potassium replacement, IV magnesium, IV fluids.  CT imaging demonstrated no aortic aneurysm or dissection, no acute findings in the chest, abdomen, pelvis, he did have some abnormal findings in the liver and pancreas, as well as colonic diverticulosis.    Patient does not drink alcohol regularly, he smokes cannabis daily, no other recreational drug use.    Family history notable for father with MI at age 58.      Past Medical History   No past medical history on file.    Past Surgical History   Past Surgical History:   Procedure Laterality Date    NO HISTORY OF SURGERY         Prior to Admission Medications   Prior to Admission Medications   Prescriptions Last Dose Informant Patient Reported? Taking?   acetaminophen (TYLENOL) 500 MG tablet 7/14/2024 at ?  Yes Yes   Sig: Take 1,000 mg by mouth every 6 hours as needed for mild pain   clindamycin (CLEOCIN) 300 MG capsule 7/15/2024 at am-7 day suply ordered 7/7/24, pt has about 3 caps left  Yes Yes   Sig: Take 300 mg by mouth 3 times daily   ibuprofen (ADVIL/MOTRIN) 200 MG tablet 7/15/2024 at ?  Yes Yes   Sig: Take 800 mg by mouth every 6 hours as needed for pain   predniSONE (DELTASONE) 20 MG tablet 7/15/2024 at am-5 day course prescribed 7/7/24, pt still has 4-6 tablets left  Yes Yes   Sig: Take 40 mg by mouth daily      Facility-Administered Medications: None     Current Facility-Administered Medications   Medication Dose Route Frequency Provider Last Rate Last Admin    acetaminophen (TYLENOL) tablet 650 mg  650 mg Oral Q4H PRN Vivian Myers PA-C        gadobutrol (GADAVIST) injection 10.43 mL   0.1 mL/kg Intravenous Once Mitul Jasmine MD        HYDROmorphone (DILAUDID) injection 0.2 mg  0.2 mg Intravenous Q2H PRN Vivian Myers PA-C        HYDROmorphone (DILAUDID) injection 0.4 mg  0.4 mg Intravenous Q2H PRN Vivian Myers PA-C        lidocaine (LMX4) cream   Topical Q1H PRN Vivian Myers PA-C        lidocaine 1 % 0.1-1 mL  0.1-1 mL Other Q1H PRN Vivian Myers PA-C        lidocaine 1 % 0.1-1 mL  0.1-1 mL Other Q1H PRN Vivian Myers PA-C        ondansetron (ZOFRAN ODT) ODT tab 4 mg  4 mg Oral Q6H PRN Vivian Myers PA-C        Or    ondansetron (ZOFRAN) injection 4 mg  4 mg Intravenous Q6H PRN Vivian Myers PA-C        oxyCODONE (ROXICODONE) tablet 5 mg  5 mg Oral Q4H PRN Vivian Myers PA-C        oxyCODONE IR (ROXICODONE) half-tab 2.5 mg  2.5 mg Oral Q4H PRN Vivian Myers PA-C        pantoprazole (PROTONIX) IV push injection 40 mg  40 mg Intravenous Daily Vivian Myers PA-C   40 mg at 07/16/24 1414    potassium chloride 10 mEq in 100 mL sterile water infusion  10 mEq Intravenous Q1H Malu Valerio  mL/hr at 07/16/24 1414 10 mEq at 07/16/24 1414    senna-docusate (SENOKOT-S/PERICOLACE) 8.6-50 MG per tablet 1 tablet  1 tablet Oral BID PRN Vivian Myers PA-C        Or    senna-docusate (SENOKOT-S/PERICOLACE) 8.6-50 MG per tablet 2 tablet  2 tablet Oral BID PRN Vivian Myers PA-C        sodium chloride (PF) 0.9% PF flush 3 mL  3 mL Intracatheter Q8H Vivian Myers PA-C        sodium chloride (PF) 0.9% PF flush 3 mL  3 mL Intracatheter q1 min prn Vivian Myers PA-C        sodium chloride (PF) 0.9% PF flush 3 mL  3 mL Intracatheter Q8H Vivian yMers, PA-C        sodium chloride (PF) 0.9% PF flush 3 mL  3 mL Intracatheter q1 min prn Vivian Myers PA-C        sodium chloride 0.9 % infusion   Intravenous Continuous Vivian Myers PA-C 100 mL/hr at 07/16/24 1420 New Bag at 07/16/24 1420     Current Outpatient Medications    Medication Sig Dispense Refill    acetaminophen (TYLENOL) 500 MG tablet Take 1,000 mg by mouth every 6 hours as needed for mild pain      clindamycin (CLEOCIN) 300 MG capsule Take 300 mg by mouth 3 times daily      ibuprofen (ADVIL/MOTRIN) 200 MG tablet Take 800 mg by mouth every 6 hours as needed for pain      predniSONE (DELTASONE) 20 MG tablet Take 40 mg by mouth daily       Current Facility-Administered Medications   Medication Dose Route Frequency Provider Last Rate Last Admin    acetaminophen (TYLENOL) tablet 650 mg  650 mg Oral Q4H PRN Vivian Myers PA-C        gadobutrol (GADAVIST) injection 10.43 mL  0.1 mL/kg Intravenous Once Mitul Jasmine MD        HYDROmorphone (DILAUDID) injection 0.2 mg  0.2 mg Intravenous Q2H PRN Vivian Myers PA-C        HYDROmorphone (DILAUDID) injection 0.4 mg  0.4 mg Intravenous Q2H PRN Vivian Myers PA-C        lidocaine (LMX4) cream   Topical Q1H PRN Vivian Myers PA-C        lidocaine 1 % 0.1-1 mL  0.1-1 mL Other Q1H PRN Vivian Myers PA-C        lidocaine 1 % 0.1-1 mL  0.1-1 mL Other Q1H PRN Vivian Myres PA-C        ondansetron (ZOFRAN ODT) ODT tab 4 mg  4 mg Oral Q6H PRN Vivian Myers PA-C        Or    ondansetron (ZOFRAN) injection 4 mg  4 mg Intravenous Q6H PRN Vivian Myers PA-C        oxyCODONE (ROXICODONE) tablet 5 mg  5 mg Oral Q4H PRN Vivian Myers PA-C        oxyCODONE IR (ROXICODONE) half-tab 2.5 mg  2.5 mg Oral Q4H PRN Vivian Myers PA-C        pantoprazole (PROTONIX) IV push injection 40 mg  40 mg Intravenous Daily Vivian Myers PA-C   40 mg at 07/16/24 1414    potassium chloride 10 mEq in 100 mL sterile water infusion  10 mEq Intravenous Q1H Malu Valerio,  100 mL/hr at 07/16/24 1414 10 mEq at 07/16/24 1414    senna-docusate (SENOKOT-S/PERICOLACE) 8.6-50 MG per tablet 1 tablet  1 tablet Oral BID PRN Vivian Myers PA-C        Or    senna-docusate (SENOKOT-S/PERICOLACE) 8.6-50 MG per tablet 2  tablet  2 tablet Oral BID PRN Vivian Myers PA-C        sodium chloride (PF) 0.9% PF flush 3 mL  3 mL Intracatheter Q8H Vivian Myers S, PA-C        sodium chloride (PF) 0.9% PF flush 3 mL  3 mL Intracatheter q1 min prn NickVivian alvarado S, PA-C        sodium chloride (PF) 0.9% PF flush 3 mL  3 mL Intracatheter Q8H Vivian Myers S, PA-C        sodium chloride (PF) 0.9% PF flush 3 mL  3 mL Intracatheter q1 min prn NickVivian alvarado S, PA-C        sodium chloride 0.9 % infusion   Intravenous Continuous Vivian Myers PA-C 100 mL/hr at 24 1420 New Bag at 24 1420     Current Outpatient Medications   Medication Sig Dispense Refill    acetaminophen (TYLENOL) 500 MG tablet Take 1,000 mg by mouth every 6 hours as needed for mild pain      clindamycin (CLEOCIN) 300 MG capsule Take 300 mg by mouth 3 times daily      ibuprofen (ADVIL/MOTRIN) 200 MG tablet Take 800 mg by mouth every 6 hours as needed for pain      predniSONE (DELTASONE) 20 MG tablet Take 40 mg by mouth daily       Allergies   No Known Allergies    Social History        Family History   I have reviewed this patient's family history and updated it with pertinent information if needed.  Family History   Problem Relation Age of Onset    Coronary Artery Disease Father           Review of Systems   A comprehensive review of system was performed and is negative other than that noted in the HPI or here.     Physical Exam   Vital Signs with Ranges  Temp:  [97.8  F (36.6  C)] 97.8  F (36.6  C)  Pulse:  [] 58  Resp:  [13-20] 13  BP: (114-184)/() 136/83  SpO2:  [96 %-100 %] 96 %  Wt Readings from Last 4 Encounters:   24 104.3 kg (230 lb)     No intake/output data recorded.    Vital signs were personally reviewed:  Temperatures:  Current - Temp: 97.8  F (36.6  C); Max - Temp  Av.8  F (36.6  C)  Min: 97.8  F (36.6  C)  Max: 97.8  F (36.6  C)  Respiration range: Resp  Av.7  Min: 13  Max: 20  Pulse range: Pulse  Av  Min: 33   Max: 102  Blood pressure range: Systolic (24hrs), Av , Min:114 , Max:184   ; Diastolic (24hrs), Av, Min:78, Max:115    Pulse oximetry range: SpO2  Av.1 %  Min: 96 %  Max: 100 %  No intake or output data in the 24 hours ending 24 1441  230 lbs 0 oz  Body mass index is 31.19 kg/m .   Body surface area is 2.3 meters squared.    Physical Exam:   General/Constitutional: appears stated age, in no apparent distress, appears to be well nourished  Respiratory: clear to auscultation bilaterally, no wheezes, no rales, no increased work of breathing  Cardiovascular: JVP normal, regular rate, regular rhythm, normal S1 and S2, no S3, S4, no murmur appreciated, no lower extremity edema    Laboratory tests personally reviewed:   CMP  Recent Labs   Lab 24  1050 24  1019    145   POTASSIUM 3.3* 2.8*   CHLORIDE 108 107   CO2  --  23   ANIONGAP  --  15   * 124*   BUN 24 19.2   CR 1.4* 1.40*   GFRESTIMATED  --  64   ISAÍAS  --  8.9   MAG  --  2.5*   PROTTOTAL  --  6.9   ALBUMIN  --  4.2   BILITOTAL  --  0.2   ALKPHOS  --  49   AST  --  24   ALT  --  44     CBC  Recent Labs   Lab 24  1050 24  1019   WBC  --  19.2*   RBC  --  4.66   HGB 14.6 14.0   HCT 43 42.7   MCV  --  92   MCH  --  30.0   MCHC  --  32.8   RDW  --  14.0   PLT  --  182     Clinically Significant Risk Factors Present on Admission        # Hypokalemia: Lowest K = 2.8 mmol/L in last 2 days, will replace as needed                       # Obesity: Estimated body mass index is 31.19 kg/m  as calculated from the following:    Height as of this encounter: 1.829 m (6').    Weight as of this encounter: 104.3 kg (230 lb).             Cardiac Arrhythmia: Bradycardia, unspecified

## 2024-07-16 NOTE — H&P
[facility]  Internal Medicine  History and Physical      Patient Name: Kin Pagan MRN# [id number]   Age: 44 year old YOB: 1980     Date of Admission:7/16/2024    Primary care provider: Clinic, [facility] [place]  Date of Service: 7/16/2024         Assessment and Plan:   Kin Pagan is a 44 year old male with a past medical history of Diverticulitis, CKD who presents to the ED today with abdominal pain.    Acute Abdominal Pain  Indeterminate Liver Lesion  Hypodense Pancreas Lesion in the pancreas  Circumferential Wall Thickening of the Sigmoid Colon  Pt presents with acute diffuse upper abdominal pain with associated nausea and emesis.  Pt with several formed BM today, but denies any diarrhea.  Pt did not report any blood to me, but reported hematochezia to ED staff.    Afebrile with wbc 19.2 with normal LFTs and negative lactic acid and low procalcitonin.  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  - follow up MRCP  - will have GI assess    Sinus Bradycardia  Pt presented with HR in the 30s and EKG with sinus bradycardia.  No prior hx of heart disease.  Not on any beta blockers.  Potassium 2.8.  Pt required one dose of Atropine with improvement in HR.  Baseline HR on past EKGs had ranged 59-88.  CT imaging with no acute findings in the chest.  Troponin x2 negative.  - continue telemetry monitoring  - check echocardiogram  - replace potassium  - Cardiology consult    Hypokalemia  Potassium 2.8  - replace per protocol    Leukocytosis  Wbc 19.2, afebrile with wbc 19.2 with normal LFTs and negative lactic acid and low procalcitonin.  CT imaging colonic diverticulosis with  circumferential wall thickening of the sigmoid colon, but per radiology no report of Acute Diverticulitis.  Pt with a recent dental infection treated with Clindamycin.  Currently, no obvious signs of dental infection and he denies any dental pain.  Has been on Prednisone for dental pain recently which may explain the Leukocytosis as well.      - check UA  - follow up blood cultures    CKD  Baseline creatinine 1.2-1.5  - monitor    Marijuana Use  - reports regular use    CODE: full  Diet/IVF: npo, NS  GI ppx:  protonix  DVT ppx: scd    Vivian Myers MS PA-C  Physician Assistant   Hospitalist Service           Chief Complaint:   Abdominal Pain         HPI:   44 year old male with a past medical history of Diverticulitis, CKD who presents to the ED today with abdominal pain.    Patient reports he awoke today with diffuse upper abdominal pain and tight feeling with nausea and has had one episode of emesis and several formed stools.  Pt did not report any blood to me, but reported hematochezia to ED staff.  He reports he had a similar pain a few months ago which was associated with an episode of passing out.  He had chest pain earlier which has since resolved.  His abdomina pain has improved after pain medications.  He denies any alcohol use, tobacco abuse.  He smokes marijuana regularly.  He reports he was seen in the ED on 7/6 with left tooth pain and was prescribed Clindamycin and Prednisone with resolution of that pain.           Past Medical History:    Diverticulitis, CKD       Past Surgical History:     Past Surgical History:   Procedure Laterality Date    NO HISTORY OF SURGERY            Social History:     Social History     Socioeconomic History    Marital status: Single     Spouse name: Not on file    Number of children: Not on file    Years of education: Not on file    Highest education level: Not on file   Occupational History    Not on file   Tobacco Use    Smoking status: Not on file    Smokeless tobacco:  Not on file   Substance and Sexual Activity    Alcohol use: Not on file    Drug use: Not on file    Sexual activity: Not on file   Other Topics Concern    Not on file   Social History Narrative    Not on file     Social Determinants of Health     Financial Resource Strain: Not on file   Food Insecurity: Not on file   Transportation Needs: Not on file   Physical Activity: Not on file   Stress: Not on file   Social Connections: Not on file   Interpersonal Safety: Not on file   Housing Stability: Not on file   He denies any alcohol use, tobacco abuse.  He smokes marijuana regularly.        Family History:     Family History   Problem Relation Age of Onset    Coronary Artery Disease Father           Allergies:    No Known Allergies       Medications:     Prior to Admission medications    Not on File          Review of Systems:   A complete ROS was performed and is negative other than what is stated in the HPI.       Physical Exam:   Blood pressure (!) 162/110, pulse 91, resp. rate 20, height 1.829 m (6'), weight 104.3 kg (230 lb), SpO2 100%.  General: Alert, interactive, NAD  HEENT: AT/NC  Chest/Resp: clear to auscultation bilaterally, no crackles or wheezes  Heart/CV: regular rate and rhythm, no murmur  Abdomen/GI: Soft, nontender, nondistended. +BS.  No rebound or guarding.  Extremities/MSK: No LE edema  Skin: Warm and dry  Neuro: Alert & oriented x 3, no focal deficits, moves all extremities equally         Labs:   ROUTINE ICU LABS (Last four results)  CMP  Recent Labs   Lab 07/16/24  1050 07/16/24  1019    145   POTASSIUM 3.3* 2.8*   CHLORIDE 108 107   CO2  --  23   ANIONGAP  --  15   * 124*   BUN 24 19.2   CR 1.4* 1.40*   GFRESTIMATED  --  64   ISAÍAS  --  8.9   MAG  --  2.5*   PROTTOTAL  --  6.9   ALBUMIN  --  4.2   BILITOTAL  --  0.2   ALKPHOS  --  49   AST  --  24   ALT  --  44     CBC  Recent Labs   Lab 07/16/24  1050 07/16/24  1019   WBC  --  19.2*   RBC  --  4.66   HGB 14.6 14.0   HCT 43 42.7   MCV   --  92   MCH  --  30.0   MCHC  --  32.8   RDW  --  14.0   PLT  --  182     INRNo lab results found in last 7 days.  Arterial Blood GasNo lab results found in last 7 days.       Imaging/Procedures:     Results for orders placed or performed during the hospital encounter of 07/16/24   CT Aortic Survey w Contrast    Narrative    CT AORTIC SURVEY W CONTRAST 7/16/2024 11:23 AM    CLINICAL HISTORY: CP, Abdominal pain, bradycardic    TECHNIQUE: CT aortic angiogram was performed following injection of IV  contrast. Multiplanar reformats were obtained. Dose reduction  techniques were used.   CONTRAST: 72mL [id number]-370    COMPARISON: None.    FINDINGS:   CT AORTIC ANGIOGRAM: No intramural hematoma in the thoracic aorta. No  aortic aneurysm or dissection. Widely patent origins of the great  vessels from the aortic arch. Conventional hepatic arterial anatomy  with widely patent origins of the mesenteric arteries. Widely patent  bilateral renal, common iliac, external iliac, internal iliac, common  femoral and superficial and deep femoral arteries without stenosis.  Mild calcified atheromatous plaque within the aortic bifurcation and  common iliac arteries.    LUNGS AND PLEURA: No infiltrate or pleural effusion. No pulmonary  nodules or masses.    MEDIASTINUM/AXILLAE: No lymphadenopathy. No filling defects in the  main pulmonary arteries. Evaluation of other pulmonary arteries is  limited by timing of the contrast bolus. No coronary artery  calcifications. No pericardial effusion.    HEPATOBILIARY: A 1.1 cm enhancing lesion in the left hepatic lobe at  the dome (series 6, image 116), favored to be a flash filling  hemangioma. A few small cysts in the liver. No calcified gallstones or  biliary ductal dilatation..    PANCREAS: A 0.9 cm hypodense lesion in the pancreatic neck/body  (series 6, image 158). No pancreatic ductal dilatation or surrounding  inflammatory changes.    SPLEEN: Normal.    ADRENAL GLANDS:  Normal.    KIDNEYS/BLADDER: Normal.    BOWEL: Colonic diverticulosis with circumferential wall thickening of  the diverticular segment of sigmoid colon. No surrounding fat  stranding. No small bowel or colonic obstruction or inflammatory  changes. Normal appendix.    PELVIC ORGANS: No pelvic masses.    ADDITIONAL FINDINGS: No lymphadenopathy. No free fluid or fluid  collections. No free air.    MUSCULOSKELETAL: Unremarkable.      Impression    IMPRESSION:  1.  No aortic aneurysm or dissection. No acute findings in the chest,  abdomen and pelvis.  2.  A 1.1 cm enhancing lesion in the left lobe of the liver,  indeterminate but likely a flash filling hemangioma. Consider  follow-up nonemergent MRI for further evaluation.  3.  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 Consider nonemergent MRCP.  4.  Colonic diverticulosis. Circumferential wall thickening of the  sigmoid colon, likely related to prior episodes of diverticulitis.  Consider follow-up colonoscopy.    CHRISTIANO CHAVEZ MD         SYSTEM ID:  [id number]

## 2024-07-16 NOTE — ED TRIAGE NOTES
Presents to ED via EMS from home. Pt initially called for abdominal pain/flank pain. Developed chest pain in route to ED. EMS found pt to be bradycardiac with a heart rate of 38. Pt denies cardiac hx. BP stable per EMS.  Pt alert and oriented. Pt notes that he has had a toothache for a week.

## 2024-07-16 NOTE — PHARMACY-ADMISSION MEDICATION HISTORY
Pharmacist Admission Medication History    Admission medication history is complete. The information provided in this note is only as accurate as the sources available at the time of the update.    Information Source(s): Patient, Hospital records, and CareEverywhere/SureScripts via in-person    Pertinent Information: pt was prescribed 7 day course clindamycin 7/7/24 and still has 3 doses left.  Pt was prescribed 5 day course prednisone 7/7/24 and still has 4-6 tabs left per his report.    Changes made to PTA medication list:  Added: clindamycin, prednisone, ibuprofen, tylenol  Deleted: None  Changed: None    Allergies reviewed with patient and updates made in EHR: yes    Medication History Completed By: Donna Moreira [facility] 7/16/2024 2:09 PM    PTA Med List   Medication Sig Last Dose    acetaminophen (TYLENOL) 500 MG tablet Take 1,000 mg by mouth every 6 hours as needed for mild pain 7/14/2024 at ?    clindamycin (CLEOCIN) 300 MG capsule Take 300 mg by mouth 3 times daily 7/15/2024 at am-7 day suply ordered 7/7/24, pt has about 3 caps left    ibuprofen (ADVIL/MOTRIN) 200 MG tablet Take 800 mg by mouth every 6 hours as needed for pain 7/15/2024 at ?    predniSONE (DELTASONE) 20 MG tablet Take 40 mg by mouth daily 7/15/2024 at am-5 day course prescribed 7/7/24, pt still has 4-6 tablets left

## 2024-07-16 NOTE — PLAN OF CARE
"        ICU End of Shift Summary.  For vital signs and complete assessments, please see documentation flowsheets.      Pertinent assessments:   Neuro: Alert and oriented x4.    Cardiac: SR with 1st degree AVB  Resp: Clear/diminished denies SOB  GI:+ bowel sounds, reports LBM today.  :Voids without difficulty  Skin: No concerns   Lines: PIV x2, Left  Drips: NS, IV K replacement as needed    Major Shift Events: Admitted to room 362. Await results of labs that are pending. Vitals are stable.  Plan (Upcoming Events): Okay to have regular diet.  Discharge/Transfer Needs: TBD     Bedside Shift Report Completed : Yes  Bedside Safety Check Completed: Yes    Goal Outcome Evaluation: progressing      Plan of Care Reviewed With: patient    Overall Patient Progress: no changeOverall Patient Progress: no change    Outcome Evaluation: Pt to ICU today after presenting to ED with symptomatic bradycardia, hypokalemia.  Vitals are now stable with tele SR with 1st degree AVB.  Admitted to room 362. New K result at 1900 to be reviewed and replace as needed.    Problem: Adult Inpatient Plan of Care  Goal: Plan of Care Review  Description: The Plan of Care Review/Shift note should be completed every shift.  The Outcome Evaluation is a brief statement about your assessment that the patient is improving, declining, or no change.  This information will be displayed automatically on your shift  note.  Outcome: Progressing  Flowsheets (Taken 7/16/2024 1805)  Outcome Evaluation: Pt to ICU today after presenting to ED with symptomatic bradycardia, hypokalemia.  Vitals are now stable with tele SR with 1st degree AVB.  Admitted to room  Plan of Care Reviewed With: patient  Overall Patient Progress: no change  Goal: Patient-Specific Goal (Individualized)  Description: You can add care plan individualizations to a care plan. Examples of Individualization might be:  \"Parent requests to be called daily at 9am for status\", \"I have a hard time " "hearing out of my right ear\", or \"Do not touch me to wake me up as it startles  me\".  Outcome: Progressing  Goal: Absence of Hospital-Acquired Illness or Injury  Outcome: Progressing  Intervention: Identify and Manage Fall Risk  Recent Flowsheet Documentation  Taken 7/16/2024 1622 by Dania Yousif RN  Safety Promotion/Fall Prevention:   activity supervised   clutter free environment maintained   increased rounding and observation   increase visualization of patient   nonskid shoes/slippers when out of bed   patient and family education   room organization consistent   room near nurse's station   safety round/check completed   supervised activity   treat underlying cause  Intervention: Prevent Skin Injury  Recent Flowsheet Documentation  Taken 7/16/2024 1622 by Dania Yousif RN  Device Skin Pressure Protection:   adhesive use limited   tubing/devices free from skin contact  Intervention: Prevent and Manage VTE (Venous Thromboembolism) Risk  Recent Flowsheet Documentation  Taken 7/16/2024 1622 by Dania Yousif RN  VTE Prevention/Management: SCDs off (sequential compression devices)  Intervention: Prevent Infection  Recent Flowsheet Documentation  Taken 7/16/2024 1622 by Dania Yousif RN  Infection Prevention:   environmental surveillance performed   hand hygiene promoted   equipment surfaces disinfected  Goal: Optimal Comfort and Wellbeing  Outcome: Progressing  Goal: Readiness for Transition of Care  Outcome: Progressing     Problem: Dysrhythmia  Goal: Normalized Cardiac Rhythm  Outcome: Progressing  Intervention: Monitor and Manage Cardiac Rhythm Effect  Recent Flowsheet Documentation  Taken 7/16/2024 1622 by Dania Yousif RN  VTE Prevention/Management: SCDs off (sequential compression devices)     Problem: Electrolyte Imbalance  Goal: Electrolyte Balance  Outcome: Progressing     "

## 2024-07-16 NOTE — CONSULTS
Gastroenterology Consultation     Consulting Physician   Mitul Jasmine MD     Reason For Consultation   Abd pain     Chief Complaint   Kin Pagan came to the hospital for evaluation of abdominal pain     History of Present Illness    Kin Pagan is a pleasant 44 year old male with a history of diverticulitis in 2018.  He presents with diffuse abdominal pain.  He states that it feels tight and bloating.  He points to the periumbilical area of his abdomen.  He has had no changes in bowel movements.  He has had solid stool.  He did have some nausea and vomited once here in the ER.  I    He is had bleeding which she attributes to hemorrhoids off and on since 2018.  This is not new.  He was recently seen in the emergency room on July 6 with tooth pain and was prescribed clindamycin and prednisone.     Past History   Past Medical History:   Diagnosis Date    Chronic kidney disease     Diverticulitis of colon 11/30/2018      Past Surgical History:   Procedure Laterality Date    NO HISTORY OF SURGERY          Family History Social History   Family History   Problem Relation Age of Onset    Coronary Artery Disease Father      Tobacco: Former smoker   Alcohol: yes   Recreational Drugs: Marijuana     Medications    Recently on clindamycin and prednisone.     Allergies   No Known Allergies      Review of Systems   A comprehensive review of systems was performed and was otherwise noncontributory.     Objective     Vitals Blood pressure 136/83, pulse 58, temperature 97.8  F (36.6  C), temperature source Oral, resp. rate 13, height 1.829 m (6'), weight 104.3 kg (230 lb), SpO2 96%.          Physical   Exam  GENERAL: alert and oriented, well nourished in no apparent distress   SKIN: warm and dry, no rashes   HEENT: atraumatic, anicteric, moist mucous membranes, neck soft/supple    PULMONARY: normal resp effort, breath sounds clear to auscultation bilaterally   CARDIOVASCULAR: normal rate and rhythm, no murmurs,  no edema   ABDOMEN: no tenderness, no distention, bowel sounds normal   MUSCULOSKELETAL: joints and gait normal   NEUROLOGICAL: appropriate mental status, grossly intact   PSYCHIATRIC: normal mood, affect and insight        Laboratory     Electrolytes    Recent Labs   Lab 07/16/24  1050 07/16/24  1019    145   POTASSIUM 3.3* 2.8*   CHLORIDE 108 107   CO2  --  23   * 124*   CR 1.4* 1.40*   BUN 24 19.2      Hematology    Recent Labs   Lab 07/16/24  1050 07/16/24  1019   HGB 14.6 14.0   MCV  --  92   WBC  --  19.2*   PLT  --  182      LFTs & Lipase    Recent Labs   Lab 07/16/24  1019   AST 24   ALT 44   ALKPHOS 49   BILITOTAL 0.2   LIPASE 39       Imaging Studies       CT AORTIC SURVEY W CONTRAST 7/16/2024 11:23 AM     CLINICAL HISTORY: CP, Abdominal pain, bradycardic     TECHNIQUE: CT aortic angiogram was performed following injection of IV  contrast. Multiplanar reformats were obtained. Dose reduction  techniques were used.   CONTRAST: 72mL Isovue-370     COMPARISON: None.     FINDINGS:   CT AORTIC ANGIOGRAM: No intramural hematoma in the thoracic aorta. No  aortic aneurysm or dissection. Widely patent origins of the great  vessels from the aortic arch. Conventional hepatic arterial anatomy  with widely patent origins of the mesenteric arteries. Widely patent  bilateral renal, common iliac, external iliac, internal iliac, common  femoral and superficial and deep femoral arteries without stenosis.  Mild calcified atheromatous plaque within the aortic bifurcation and  common iliac arteries.     LUNGS AND PLEURA: No infiltrate or pleural effusion. No pulmonary  nodules or masses.     MEDIASTINUM/AXILLAE: No lymphadenopathy. No filling defects in the  main pulmonary arteries. Evaluation of other pulmonary arteries is  limited by timing of the contrast bolus. No coronary artery  calcifications. No pericardial effusion.     HEPATOBILIARY: A 1.1 cm enhancing lesion in the left hepatic lobe at  the dome (series  6, image 116), favored to be a flash filling  hemangioma. A few small cysts in the liver. No calcified gallstones or  biliary ductal dilatation..     PANCREAS: A 0.9 cm hypodense lesion in the pancreatic neck/body  (series 6, image 158). No pancreatic ductal dilatation or surrounding  inflammatory changes.     SPLEEN: Normal.     ADRENAL GLANDS: Normal.     KIDNEYS/BLADDER: Normal.     BOWEL: Colonic diverticulosis with circumferential wall thickening of  the diverticular segment of sigmoid colon. No surrounding fat  stranding. No small bowel or colonic obstruction or inflammatory  changes. Normal appendix.     PELVIC ORGANS: No pelvic masses.     ADDITIONAL FINDINGS: No lymphadenopathy. No free fluid or fluid  collections. No free air.     MUSCULOSKELETAL: Unremarkable.                                                                      IMPRESSION:  1.  No aortic aneurysm or dissection. No acute findings in the chest,  abdomen and pelvis.  2.  A 1.1 cm enhancing lesion in the left lobe of the liver,  indeterminate but likely a flash filling hemangioma. Consider  follow-up nonemergent MRI for further evaluation.  3.  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 Consider nonemergent MRCP.  4.  Colonic diverticulosis. Circumferential wall thickening of the  sigmoid colon, likely related to prior episodes of diverticulitis.  Consider follow-up colonoscopy.     CHRISTIANO CHAVEZ MD        I have reviewed the current diagnostic and laboratory tests.           Impression and Plan    Acute onset of abdominal pain, bloating, nausea, and vomiting.  Circumferential wall thickening of the sigmoid colon thought to be related to previous episode of diverticulitis.  His only previous episode was in 2018.  Elevated WBC count.  Other labs largely unremarkable.  Currently symptoms have markedly improved.  Cause of acute pain not clear.  Unsure if circumferential wall  thickening of the sigmoid colon is related.  Liver lesion and pancreatic lesion are not likely related.  LFTs are normal.  Lipase is normal.  Lactic acid normal.  MR RI/MRCP scheduled.  Symptoms improved.  Recommend following symptoms for now.  0.9 cm hypodense lesion of the pancreas.  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  MRCP scheduled.  1.1 cm enhancing lesion of the lobe of the liver likely hemangioma.  MRI scheduled for today.  Intermittent rectal bleeding since 2018 with wall thickening of the sigmoid colon.  Would recommend outpatient colonoscopy.  Sinus bradycardia.  Cardiology consult placed by the primary service.  Chronic kidney disease with hypokalemia.        60 minutes of total time was spent providing patient care including patient evaluation, reviewing documentation/test results, and .           Brett Hanson MD  Thank you for the opportunity to participate in the care of this patient.   Please feel free to call me with any questions or concerns.  Phone number [phone].

## 2024-07-16 NOTE — ED NOTES
Bed: ED36  Expected date: 7/16/24  Expected time: 10:33 AM  Means of arrival:   Comments:  FIDENCIO

## 2024-07-16 NOTE — ED NOTES
[facility]  ED Nurse Handoff Report    ED Chief complaint: Chest Pain  . ED Diagnosis:   Final diagnoses:   Bradycardia   Abdominal pain   Chest pain   Hypokalemia   Pancreatic lesion       Allergies: No Known Allergies    Code Status: Full Code    Activity level - Baseline/Home:  independent.  Activity Level - Current:   standby.   Lift room needed: No.   Bariatric: No   Needed: No   Isolation: No.   Infection: Not Applicable.     Respiratory status: Room air    Vital Signs (within 30 minutes):   Vitals:    07/16/24 1410 07/16/24 1420 07/16/24 1430 07/16/24 1440   BP: 136/83 133/83 (!) 149/76 133/89   Pulse: 58 66 61 58   Resp: 13      Temp:       TempSrc:       SpO2: 96% 100% 99% 100%   Weight:       Height:           Cardiac Rhythm:  ,   Cardiac  Cardiac Rhythm: Sinus bradycardia  Pain level:    Patient confused: No.   Patient Falls Risk: patient and family education.   Elimination Status: Has voided     Patient Report - Initial Complaint: Abdominal pain, chest pain.   Focused Assessment: 44 year old male with a history of diverticulitis who presents to the emergency department for evaluation of abdominal pain. He reports that earlier today he had a sudden onset of generalized abdominal pain and flank pain that is more severe in his right lower quadrant and right flank area. He rates this pain as a 10/10 in severity. He denies any chest pain at bedside, but told EMS that he had some mild chest pain en route to the ED. He does endorse difficulty breathing and feels that he is more short of breath compared to his baseline. He has not taken any medications to manage his pain. He also endorses hematochezia and states that this is bright red in color. Per EMS, he was bradycardic in the high 30s bpm en route without any cardiac history. He does have a history of diverticulitis. He denies any regular alcohol use, but does smoke marijuana on occasion without any today. Denies any  suicidal ideation, thoughts of self harm, or homicidal ideation. He also notes that he has had a toothache for the past week.      Abnormal Results:   Labs Ordered and Resulted from Time of ED Arrival to Time of ED Departure   COMPREHENSIVE METABOLIC PANEL - Abnormal       Result Value    Sodium 145      Potassium 2.8 (*)     Carbon Dioxide (CO2) 23      Anion Gap 15      Urea Nitrogen 19.2      Creatinine 1.40 (*)     GFR Estimate 64      Calcium 8.9      Chloride 107      Glucose 124 (*)     Alkaline Phosphatase 49      AST 24      ALT 44      Protein Total 6.9      Albumin 4.2      Bilirubin Total 0.2     ROUTINE UA WITH MICROSCOPIC REFLEX TO CULTURE - Abnormal    Color Urine Straw      Appearance Urine Clear      Glucose Urine Negative      Bilirubin Urine Negative      Ketones Urine Negative      Specific Gravity Urine 1.034      Blood Urine Negative      pH Urine 8.0 (*)     Protein Albumin Urine Negative      Urobilinogen Urine Normal      Nitrite Urine Negative      Leukocyte Esterase Urine Negative      Mucus Urine Present (*)     RBC Urine 1      WBC Urine 5     CBC WITH PLATELETS AND DIFFERENTIAL - Abnormal    WBC Count 19.2 (*)     RBC Count 4.66      Hemoglobin 14.0      Hematocrit 42.7      MCV 92      MCH 30.0      MCHC 32.8      RDW 14.0      Platelet Count 182      NRBCs per 100 WBC 0      Absolute NRBCs 0.0     MANUAL DIFFERENTIAL - Abnormal    % Neutrophils 57      % Lymphocytes 34      % Monocytes 6      % Eosinophils 2      % Basophils 0      % Myelocytes 1      Absolute Neutrophils 10.9 (*)     Absolute Lymphocytes 6.5 (*)     Absolute Monocytes 1.2      Absolute Eosinophils 0.4      Absolute Basophils 0.0      Absolute Myelocytes 0.2 (*)     RBC Morphology Confirmed RBC Indices      Platelet Assessment        Value: Automated Count Confirmed. Platelet morphology is normal.   ISTAT BASIC CHEM ICA HEMATOCRIT POCT - Abnormal    Chloride POCT 108      Potassium POCT 3.3 (*)     Sodium POCT 145       UREA NITROGEN POCT 24      Calcium, Ionized Whole Blood POCT 4.8      Glucose Whole Blood POCT 108 (*)     Anion Gap POCT 15.0 (*)     Hemoglobin POCT 14.6      Hematocrit POCT 43      Creatinine POCT 1.4 (*)     TOTAL CO2 POCT 26     MAGNESIUM - Abnormal    Magnesium 2.5 (*)    URINE DRUG SCREEN PANEL - Abnormal    Amphetamines Urine Screen Negative      Barbituates Urine Screen Negative      Benzodiazepine Urine Screen Negative      Cannabinoids Urine Screen Positive (*)     Cocaine Urine Screen Negative      Fentanyl Qual Urine Screen Negative      Opiates Urine Screen Negative      PCP Urine Screen Negative     LIPASE - Normal    Lipase 39     TROPONIN T, HIGH SENSITIVITY - Normal    Troponin T, High Sensitivity <6     LACTIC ACID WHOLE BLOOD - Normal    Lactic Acid 1.4     TROPONIN T, HIGH SENSITIVITY - Normal    Troponin T, High Sensitivity <6     INFLUENZA A/B, RSV, & SARS-COV2 PCR - Normal    Influenza A PCR Negative      Influenza B PCR Negative      RSV PCR Negative      SARS CoV2 PCR Negative     PROCALCITONIN - Normal    Procalcitonin 0.05     HEMOGLOBIN   GLUCOSE MONITOR NURSING POCT   LYME DISEASE DNA DETECTION BY PCR   BLOOD CULTURE   BLOOD CULTURE        CT Aortic Survey w Contrast   Final Result   IMPRESSION:   1.  No aortic aneurysm or dissection. No acute findings in the chest,   abdomen and pelvis.   2.  A 1.1 cm enhancing lesion in the left lobe of the liver,   indeterminate but likely a flash filling hemangioma. Consider   follow-up nonemergent MRI for further evaluation.   3.  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 Consider nonemergent MRCP.   4.  Colonic diverticulosis. Circumferential wall thickening of the   sigmoid colon, likely related to prior episodes of diverticulitis.   Consider follow-up colonoscopy.      CHRISTIANO CHAVEZ MD            SYSTEM ID:  [id number]      MR Abdomen MRCP w/o & w Contrast    (Results Pending)    Echocardiogram Complete    (Results Pending)       Treatments provided: Labs, imaging, MRCP, electrolyte replacement, given 1 mg atropine for HR 30s upon arrival (has since improved)  Family Comments: No family here.   OBS brochure/video discussed/provided to patient:  N/A  ED Medications:   Medications   potassium chloride 10 mEq in 100 mL sterile water infusion (0 mEq Intravenous Paused 7/16/24 1453)   lidocaine 1 % 0.1-1 mL (has no administration in time range)   lidocaine (LMX4) cream (has no administration in time range)   sodium chloride (PF) 0.9% PF flush 3 mL (has no administration in time range)   sodium chloride (PF) 0.9% PF flush 3 mL (has no administration in time range)   senna-docusate (SENOKOT-S/PERICOLACE) 8.6-50 MG per tablet 1 tablet (has no administration in time range)     Or   senna-docusate (SENOKOT-S/PERICOLACE) 8.6-50 MG per tablet 2 tablet (has no administration in time range)   sodium chloride 0.9 % infusion ( Intravenous $New Bag 7/16/24 1420)   ondansetron (ZOFRAN ODT) ODT tab 4 mg (has no administration in time range)     Or   ondansetron (ZOFRAN) injection 4 mg (has no administration in time range)   acetaminophen (TYLENOL) tablet 650 mg (has no administration in time range)   oxyCODONE IR (ROXICODONE) half-tab 2.5 mg (has no administration in time range)   oxyCODONE (ROXICODONE) tablet 5 mg (has no administration in time range)   HYDROmorphone (DILAUDID) injection 0.2 mg (has no administration in time range)   HYDROmorphone (DILAUDID) injection 0.4 mg (has no administration in time range)   pantoprazole (PROTONIX) IV push injection 40 mg (40 mg Intravenous $Given 7/16/24 1414)   lidocaine 1 % 0.1-1 mL (has no administration in time range)   sodium chloride (PF) 0.9% PF flush 3 mL (has no administration in time range)   sodium chloride (PF) 0.9% PF flush 3 mL (has no administration in time range)   HYDROmorphone (PF) (DILAUDID) injection 0.5 mg (0.5 mg Intravenous $Given 7/16/24 3716)    atropine injection 1 mg (1 mg Intravenous $Given 7/16/24 1049)   magnesium sulfate 2 g in 50 mL sterile water intermittent infusion (0 g Intravenous Stopped 7/16/24 1130)   iopamidol (ISOVUE-370) solution 500 mL (72 mLs Intravenous $Given 7/16/24 1113)   CT Scan Flush (60 mLs Intravenous $Given 7/16/24 1113)   lactated ringers BOLUS 1,000 mL (0 mLs Intravenous Stopped 7/16/24 1209)   perflutren diluted 1mL to 2mL with saline (OPTISON) diluted injection 4 mL (4 mLs Intravenous $Given 7/16/24 1318)   sodium chloride (PF) 0.9% PF flush 10 mL (10 mLs Intravenous $Given 7/16/24 1319)   gadobutrol (GADAVIST) injection 10.43 mL (10.43 mLs Intravenous $Given 7/16/24 1452)       Drips infusing:  No  For the majority of the shift this patient was Green.   Interventions performed were N/A.    Sepsis treatment initiated: No    Cares/treatment/interventions/medications to be completed following ED care: MRCP results, cardiology & GI referral, monitor electrolytes after replacement    ED Nurse Name: Abby Jorgensen RN  3:02 PM

## 2024-07-16 NOTE — ED PROVIDER NOTES
ED APC SUPERVISION NOTE:   I evaluated this patient in conjunction with Rhonda Ward PA-C  I have participated in the care of the patient and personally performed key elements of the history, exam, and medical decision making.      HPI:   Kin Pagan is a 44 year old male with a history of diverticulitis presents to the ED for abdominal pain. The pain onset suddenly this morning, and is present in his R lower quadrant and R flank. He rates the pain a 10/10 in severity and it is accompanied by shortness of breath and difficulty breathing. En route, the patient had mentioned he had mild chest pain and was bradycardic at 30 bpm, but denies any chest pain at bedside. He also endorses hematochezia, which is bright red in color. The patient endorses marijuana use, but denies any use today. He denies any significant alcohol use. His father  of cardiac arrest in 2018. Of note, the patient has had a toothache for the past week.     Independent Historian:   None    Review of External Notes: none      EXAM:   General: Ill-appearing man, lying in bed eyes closed   HEENT: Atraumatic   EOM normal   External ears normal   Trachea midline  Neck: Supple, normal ROM  CV: Bradycardia, regular rhythm   No lower extremity edema  2+ radial and DP pulses  PULM: Breath sounds normal bilaterally  No wheezes or rales  ABD: Soft, non-tender, non-distended  Normal bowel sounds   No rebound or guarding   MSK: No gross deformities  NEURO: Alert, no focal deficits  Skin: Warm, dry and intact    Independent Interpretation (X-rays, CTs, rhythm strip):  None    Consultations/Discussion of Management or Tests:  Please see full ED provider note from [facility] for consultations    Social Determinants of Health affecting care:   None     MEDICAL DECISION MAKING/ASSESSMENT AND PLAN:   Gentleman presents with epigastric abdominal pain with significant bradycardia.  1 episode of vomiting that was bradycardic prior to arrival.  EKG shows sinus  bradycardia.  I-STAT Chem-8 does not show significant electrolyte abnormalities or kidney dysfunction to explain his bradycardia.  Heart rate responded well to 1 mg of atropine after which his QTc was slightly prolonged greater than 500.  Empiric magnesium sulfate given though ultimately magnesium levels were WNL.    No evidence of aortic dissection.  Mass noted in the pancreatic tail on CT scan.  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  Plan for MRI which does not show evidence of malignant tumor.  Labs are otherwise reassuring except for a significant leukocytosis.  Lactic is WNL as is Pro-Ryan.  Will obtain blood cultures but hold antibiotics at this time.  Potassium was replaced IV.  Patient admitted under observation for telemetry given profound symptomatic bradycardia now resolved.  It seems that his normal heart rate is between 50 and 60 bpm.     DIAGNOSIS:     ICD-10-CM    1. Bradycardia  R00.1 Zio Patch Mail Out      2. Abdominal pain  R10.9       3. Chest pain  R07.9       4. Hypokalemia  E87.6       5. Pancreatic lesion  K86.9                DISPOSITION:   admit     Scribe Disclosure:  Denise MARQUEZ, am serving as a scribe at 11:16 AM on 7/16/2024 to document services personally performed by Rhonda Ward PA-C based on my observations and the provider's statements to me.   7/16/2024  [facility] EMERGENCY DEPT     Malu Valerio,   07/16/24 [id number]

## 2024-07-17 ENCOUNTER — APPOINTMENT (OUTPATIENT)
Dept: CARDIOLOGY | Facility: CLINIC | Age: 44
DRG: 641 | End: 2024-07-17
Attending: INTERNAL MEDICINE
Payer: MEDICAID

## 2024-07-17 VITALS
HEART RATE: 62 BPM | TEMPERATURE: 98.3 F | BODY MASS INDEX: 31.15 KG/M2 | HEIGHT: 72 IN | RESPIRATION RATE: 13 BRPM | WEIGHT: 230 LBS | SYSTOLIC BLOOD PRESSURE: 134 MMHG | DIASTOLIC BLOOD PRESSURE: 80 MMHG | OXYGEN SATURATION: 99 %

## 2024-07-17 LAB
ALBUMIN SERPL BCG-MCNC: 3.6 G/DL (ref 3.5–5.2)
ALP SERPL-CCNC: 43 U/L (ref 40–150)
ALT SERPL W P-5'-P-CCNC: 31 U/L (ref 0–70)
ANION GAP SERPL CALCULATED.3IONS-SCNC: 8 MMOL/L (ref 7–15)
AST SERPL W P-5'-P-CCNC: 12 U/L (ref 0–45)
BILIRUB SERPL-MCNC: 0.3 MG/DL
BUN SERPL-MCNC: 11.5 MG/DL (ref 6–20)
CALCIUM SERPL-MCNC: 8.5 MG/DL (ref 8.8–10.4)
CHLORIDE SERPL-SCNC: 108 MMOL/L (ref 98–107)
CHOLEST SERPL-MCNC: 177 MG/DL
CREAT SERPL-MCNC: 1.35 MG/DL (ref 0.67–1.17)
EGFRCR SERPLBLD CKD-EPI 2021: 67 ML/MIN/1.73M2
ERYTHROCYTE [DISTWIDTH] IN BLOOD BY AUTOMATED COUNT: 14.1 % (ref 10–15)
GLUCOSE BLDC GLUCOMTR-MCNC: 82 MG/DL (ref 70–99)
GLUCOSE BLDC GLUCOMTR-MCNC: 88 MG/DL (ref 70–99)
GLUCOSE BLDC GLUCOMTR-MCNC: 88 MG/DL (ref 70–99)
GLUCOSE SERPL-MCNC: 90 MG/DL (ref 70–99)
HCO3 SERPL-SCNC: 25 MMOL/L (ref 22–29)
HCT VFR BLD AUTO: 35.3 % (ref 40–53)
HDLC SERPL-MCNC: 58 MG/DL
HGB BLD-MCNC: 11.8 G/DL (ref 13.3–17.7)
HGB BLD-MCNC: 11.8 G/DL (ref 13.3–17.7)
LDLC SERPL CALC-MCNC: 110 MG/DL
MAGNESIUM SERPL-MCNC: 2.3 MG/DL (ref 1.7–2.3)
MCH RBC QN AUTO: 30.1 PG (ref 26.5–33)
MCHC RBC AUTO-ENTMCNC: 33.4 G/DL (ref 31.5–36.5)
MCV RBC AUTO: 90 FL (ref 78–100)
NONHDLC SERPL-MCNC: 119 MG/DL
PHOSPHATE SERPL-MCNC: 2.9 MG/DL (ref 2.5–4.5)
PLATELET # BLD AUTO: 193 10E3/UL (ref 150–450)
POTASSIUM SERPL-SCNC: 3.8 MMOL/L (ref 3.4–5.3)
PROT SERPL-MCNC: 5.8 G/DL (ref 6.4–8.3)
RBC # BLD AUTO: 3.92 10E6/UL (ref 4.4–5.9)
SODIUM SERPL-SCNC: 141 MMOL/L (ref 135–145)
TRIGL SERPL-MCNC: 44 MG/DL
TSH SERPL DL<=0.005 MIU/L-ACNC: 1.34 UIU/ML (ref 0.3–4.2)
WBC # BLD AUTO: 11.1 10E3/UL (ref 4–11)

## 2024-07-17 PROCEDURE — 93325 DOPPLER ECHO COLOR FLOW MAPG: CPT | Mod: 26 | Performed by: INTERNAL MEDICINE

## 2024-07-17 PROCEDURE — 85027 COMPLETE CBC AUTOMATED: CPT | Performed by: PHYSICIAN ASSISTANT

## 2024-07-17 PROCEDURE — 84443 ASSAY THYROID STIM HORMONE: CPT | Performed by: INTERNAL MEDICINE

## 2024-07-17 PROCEDURE — 36415 COLL VENOUS BLD VENIPUNCTURE: CPT | Performed by: PHYSICIAN ASSISTANT

## 2024-07-17 PROCEDURE — 99239 HOSP IP/OBS DSCHRG MGMT >30: CPT | Performed by: HOSPITALIST

## 2024-07-17 PROCEDURE — 250N000013 HC RX MED GY IP 250 OP 250 PS 637: Performed by: INTERNAL MEDICINE

## 2024-07-17 PROCEDURE — 255N000002 HC RX 255 OP 636: Performed by: INTERNAL MEDICINE

## 2024-07-17 PROCEDURE — 93016 CV STRESS TEST SUPVJ ONLY: CPT | Performed by: INTERNAL MEDICINE

## 2024-07-17 PROCEDURE — 93325 DOPPLER ECHO COLOR FLOW MAPG: CPT | Mod: TC

## 2024-07-17 PROCEDURE — 80053 COMPREHEN METABOLIC PANEL: CPT | Performed by: PHYSICIAN ASSISTANT

## 2024-07-17 PROCEDURE — 250N000011 HC RX IP 250 OP 636: Performed by: PHYSICIAN ASSISTANT

## 2024-07-17 PROCEDURE — 85018 HEMOGLOBIN: CPT | Performed by: PHYSICIAN ASSISTANT

## 2024-07-17 PROCEDURE — 93350 STRESS TTE ONLY: CPT | Mod: 26 | Performed by: INTERNAL MEDICINE

## 2024-07-17 PROCEDURE — C8928 TTE W OR W/O FOL W/CON,STRES: HCPCS

## 2024-07-17 PROCEDURE — 93321 DOPPLER ECHO F-UP/LMTD STD: CPT | Mod: 26 | Performed by: INTERNAL MEDICINE

## 2024-07-17 PROCEDURE — 250N000013 HC RX MED GY IP 250 OP 250 PS 637: Performed by: HOSPITALIST

## 2024-07-17 PROCEDURE — 83735 ASSAY OF MAGNESIUM: CPT | Performed by: INTERNAL MEDICINE

## 2024-07-17 PROCEDURE — 93018 CV STRESS TEST I&R ONLY: CPT | Performed by: INTERNAL MEDICINE

## 2024-07-17 PROCEDURE — 84100 ASSAY OF PHOSPHORUS: CPT | Performed by: INTERNAL MEDICINE

## 2024-07-17 RX ORDER — POTASSIUM CHLORIDE 1500 MG/1
20 TABLET, EXTENDED RELEASE ORAL ONCE
Status: COMPLETED | OUTPATIENT
Start: 2024-07-17 | End: 2024-07-17

## 2024-07-17 RX ADMIN — POTASSIUM CHLORIDE 20 MEQ: 1500 TABLET, EXTENDED RELEASE ORAL at 06:07

## 2024-07-17 RX ADMIN — PANTOPRAZOLE SODIUM 40 MG: 40 INJECTION, POWDER, FOR SOLUTION INTRAVENOUS at 09:05

## 2024-07-17 RX ADMIN — HUMAN ALBUMIN MICROSPHERES AND PERFLUTREN 3 ML: 10; .22 INJECTION, SOLUTION INTRAVENOUS at 08:26

## 2024-07-17 RX ADMIN — CLINDAMYCIN HYDROCHLORIDE 300 MG: 300 CAPSULE ORAL at 09:05

## 2024-07-17 ASSESSMENT — ACTIVITIES OF DAILY LIVING (ADL)
ADLS_ACUITY_SCORE: 21
ADLS_ACUITY_SCORE: 21
ADLS_ACUITY_SCORE: 23
ADLS_ACUITY_SCORE: 21
ADLS_ACUITY_SCORE: 23
ADLS_ACUITY_SCORE: 21
ADLS_ACUITY_SCORE: 23
ADLS_ACUITY_SCORE: 21

## 2024-07-17 NOTE — DISCHARGE SUMMARY
[facility]  Hospitalist Discharge Summary      Date of Admission:  7/16/2024  Date of Discharge:  7/17/2024  1:30 PM  Discharging Provider: Denis Ashley DO  Discharge Service: Hospitalist Service    Discharge Diagnoses   Abdominal pain, resolved  Sinus bradycardia  Hypokalemia  Hx diverticulitis w/colonic wall thickening of sigmoid  Pancreatic and liver lesions  Recent dental infection  Marijuana use  CKD  Possible FAITH      Follow-ups Needed After Discharge   Follow-up Appointments     Follow-up and recommended labs and tests       Follow up with primary care provider, [facility],   within 7 days for hospital follow- up.  No follow up labs or test are   needed.    Follow up with GI in 2-4 weeks or as directed for colonoscopy and EUS/FNA  Follow up with cardiology in 4-6 weeks or as directed for Zio patch   results          Unresulted Labs Ordered in the Past 30 Days of this Admission       Date and Time Order Name Status Description    7/16/2024  2:43 PM Lyme disease DNA detection by PCR In process     7/16/2024 11:22 AM Blood Culture Line, venous Preliminary     7/16/2024 11:22 AM Blood Culture Arm, Right Preliminary         These results will be followed up by PCP    Discharge Disposition   Discharged to home  Condition at discharge: Stable    Hospital Course   Kin Pagan is a 44 year old male with a past medical history of Diverticulitis, CKD who presents to the ED today with abdominal pain.     Acute Abdominal Pain, resolved  -unclear etiology, CT negative for diverticulitis although diverticulosis noted and sigmoid colon thickening appears chronic  -had been taking steroids, large dose NSAIDs so gastritis could be contributing  -possibly component of pancreatitis although lipase normal and CT without pancreatic inflammation  -resolved with protonix, bland diet. Cont bland diet on discharge and avoid alcohol, NSAIDS. If recurs consider starting  PPI    Indeterminate Liver Lesion  Hypodense Pancreas Lesion in the pancreas  Circumferential Wall Thickening of the Sigmoid Colon  -Afebrile with wbc 19.2 with normal LFTs and negative lactic acid and low procalcitonin.  CT imaging revealed an indeterminate 1.1 cm enhancing lesion in the left lobe of the liver, a 0.9 cm hypodense lesion in the pancreas, likely a side branch intraductal papillary mucinous neoplasm and colonic diverticulosis with circumferential wall thickening of the sigmoid colon, but per radiology no report of -MRCP concerning for cystic pancreatic neoplasm  -seen by GI and plans for colonoscopy, EUS w/FNA as outpt  -oncology consulted but elected to discharge prior to seeing, await GI workup as outpt     Sinus Bradycardia  Pt presented with HR in the 30s and EKG with sinus bradycardia.  No prior hx of heart disease.  Not on any beta blockers.  Potassium 2.8.  Pt required one dose of Atropine with improvement in HR.  Baseline HR on past EKGs had ranged 59-88.  CT imaging with no acute findings in the chest.  Troponin x2 negative.  - seen by cardiology with reassuring ECHO and reassuring stress ECHO  -suspect electrolyte related although likely has underlying FAITH as well  -outpt sleep study ordered     Hypokalemia  Potassium 2.8  - replace per protocol     Recent dental infection, steroid use  Leukocytosis  Wbc 19.2, afebrile with wbc 19.2 with normal LFTs and negative lactic acid and low procalcitonin.  CT imaging colonic diverticulosis with circumferential wall thickening of the sigmoid colon, but per radiology no report of Acute Diverticulitis.  Pt with a recent dental infection treated with Clindamycin.  Currently, no obvious signs of dental infection and he denies any dental pain.  Has been on Prednisone for dental pain recently which may explain the Leukocytosis as well.      - infectious workup negative to date, plan to continue previous course of clindamycin for a few more days to complete  (already has at home)     CKD  Baseline creatinine 1.2-1.5  - monitor     Marijuana Use  - reports regular use     Consultations This Hospital Stay   CARDIOLOGY IP CONSULT  GASTROENTEROLOGY IP CONSULT  HEMATOLOGY & ONCOLOGY IP CONSULT    Code Status   Full Code    Time Spent on this Encounter   I, Denis Ashley DO, personally saw the patient today and spent greater than 30 minutes discharging this patient.       Denis Ashley DO  [facility]  [address] [facility] [address]  Phone: [phone]  Fax: [phone]  ______________________________________________________________________    Physical Exam   Vital Signs: Temp: 98.3  F (36.8  C) Temp src: Oral BP: 134/80 Pulse: 62   Resp: 13 SpO2: 99 % O2 Device: None (Room air)    Weight: 230 lbs 0 oz  Face to face completed day of discharge       Primary Care Physician   [facility]    Discharge Orders      Reason for your hospital stay    Admitted for NV, found to have liver/pancreatic lesions. Follow-up with GI as outpt for colonoscopy and EUS/FNA of lesions, pending that may need oncology eval. Recommend no alcohol and low fat diet, would stop the ibuprofen. Recommend he complete previously prescribed course of clindamycin, ok to stop steroids if he doesn't tolerate them well. Zio patch ordered with outpt follow-up with cardiology     Follow-up and recommended labs and tests     Follow up with primary care provider, [facility], within 7 days for hospital follow- up.  No follow up labs or test are needed.    Follow up with GI in 2-4 weeks or as directed for colonoscopy and EUS/FNA  Follow up with cardiology in 4-6 weeks or as directed for Zio patch results     Activity    Your activity upon discharge: activity as tolerated     Diet    Follow this diet upon discharge: Orders Placed This Encounter  Low fat diet, no alcohol     Zio Patch Mail Out       Significant Results and Procedures   Most  Recent 3 CBC's:  Recent Labs   Lab Test 07/17/24  0536 07/16/24 2235 07/16/24 1912 07/16/24  1050 07/16/24  1019   WBC 11.1*  --   --   --  19.2*   HGB 11.8*  11.8* 11.7* 12.0*   < > 14.0   MCV 90  --   --   --  92     --   --   --  182    < > = values in this interval not displayed.     Most Recent 3 BMP's:  Recent Labs   Lab Test 07/17/24  0758 07/17/24  0536 07/17/24  0416 07/16/24 1948 07/16/24 1912 07/16/24  1050 07/16/24  1019   NA  --  141  --   --   --  145 145   POTASSIUM  --  3.8  --   --  3.9 3.3* 2.8*   CHLORIDE  --  108*  --   --   --  108 107   CO2  --  25  --   --   --   --  23   BUN  --  11.5  --   --   --  24 19.2   CR  --  1.35*  --   --   --  1.4* 1.40*   ANIONGAP  --  8  --   --   --   --  15   ISAÍAS  --  8.5*  --   --   --   --  8.9   GLC 88 90 82   < >  --  108* 124*    < > = values in this interval not displayed.     Most Recent 2 LFT's:  Recent Labs   Lab Test 07/17/24 0536 07/16/24  1019   AST 12 24   ALT 31 44   ALKPHOS 43 49   BILITOTAL 0.3 0.2     Most Recent 3 INR's:No lab results found.  Most Recent 3 Hemoglobins:  Recent Labs   Lab Test 07/17/24 0536 07/16/24 2235 07/16/24 1912   HGB 11.8*  11.8* 11.7* 12.0*     Most Recent 3 Troponin's:No lab results found.  Most Recent 3 BNP's:No lab results found.,   Results for orders placed or performed during the hospital encounter of 07/16/24   CT Aortic Survey w Contrast    Narrative    CT AORTIC SURVEY W CONTRAST 7/16/2024 11:23 AM    CLINICAL HISTORY: CP, Abdominal pain, bradycardic    TECHNIQUE: CT aortic angiogram was performed following injection of IV  contrast. Multiplanar reformats were obtained. Dose reduction  techniques were used.   CONTRAST: 72mL [id number]-370    COMPARISON: None.    FINDINGS:   CT AORTIC ANGIOGRAM: No intramural hematoma in the thoracic aorta. No  aortic aneurysm or dissection. Widely patent origins of the great  vessels from the aortic arch. Conventional hepatic arterial anatomy  with widely patent  origins of the mesenteric arteries. Widely patent  bilateral renal, common iliac, external iliac, internal iliac, common  femoral and superficial and deep femoral arteries without stenosis.  Mild calcified atheromatous plaque within the aortic bifurcation and  common iliac arteries.    LUNGS AND PLEURA: No infiltrate or pleural effusion. No pulmonary  nodules or masses.    MEDIASTINUM/AXILLAE: No lymphadenopathy. No filling defects in the  main pulmonary arteries. Evaluation of other pulmonary arteries is  limited by timing of the contrast bolus. No coronary artery  calcifications. No pericardial effusion.    HEPATOBILIARY: A 1.1 cm enhancing lesion in the left hepatic lobe at  the dome (series 6, image 116), favored to be a flash filling  hemangioma. A few small cysts in the liver. No calcified gallstones or  biliary ductal dilatation..    PANCREAS: A 0.9 cm hypodense lesion in the pancreatic neck/body  (series 6, image 158). No pancreatic ductal dilatation or surrounding  inflammatory changes.    SPLEEN: Normal.    ADRENAL GLANDS: Normal.    KIDNEYS/BLADDER: Normal.    BOWEL: Colonic diverticulosis with circumferential wall thickening of  the diverticular segment of sigmoid colon. No surrounding fat  stranding. No small bowel or colonic obstruction or inflammatory  changes. Normal appendix.    PELVIC ORGANS: No pelvic masses.    ADDITIONAL FINDINGS: No lymphadenopathy. No free fluid or fluid  collections. No free air.    MUSCULOSKELETAL: Unremarkable.      Impression    IMPRESSION:  1.  No aortic aneurysm or dissection. No acute findings in the chest,  abdomen and pelvis.  2.  A 1.1 cm enhancing lesion in the left lobe of the liver,  indeterminate but likely a flash filling hemangioma. Consider  follow-up nonemergent MRI for further evaluation.  3.  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 Consider nonemergent MRCP.  4.  Colonic diverticulosis.  Circumferential wall thickening of the  sigmoid colon, likely related to prior episodes of diverticulitis.  Consider follow-up colonoscopy.    CHRISTIANO CHAVEZ MD         SYSTEM ID:  [id number]   MR Abdomen MRCP w/o & w Contrast    Narrative    MR ABDOMEN MRCP WITHOUT AND WITH CONTRAST   7/16/2024 3:31 PM     HISTORY: Liver and pancreatic lesion, new symptomatic bradycardia,  paraneoplastic neoplasm?    TECHNIQUE: Multiplanar, multiecho MRI was performed using T1, T2, and  in- and out-of-phase imaging. Pre- and postcontrast T1 images were  acquired after the administration of 10mL Gadavist IV. MRCP images and  coronal 3-D thin section MRCP images, were acquired on the scanner  through the biliary tree. 3-D image reformatting was performed on the  acquisition scanner.    COMPARISON: CT 7/16/2024.    FINDINGS:  HEPATOBILIARY:   MRCP shows no gallstones within the gallbladder lumen. No biliary  ductal dilatation or choledocholithiasis. No main pancreatic duct  dilatation. Common bile duct is 3 mm. Main pancreas duct is 3 mm.    Suggestion of mild fatty liver. Small simple cysts within the liver  without suspicious enhancement. One of these on the left is 1.6 cm  series 23 image 22. Additional increased T2 signal nodule at segment 2  is seen measuring 1 cm series 5 image 8. This appears to show  enhancement though it is limited in view, series 18 image 17. No  worrisome hepatic observation.    PANCREAS: Two increased T2 signal foci suggesting small cysts within  the pancreas. One of these is at the junction of the pancreas neck and  body measuring 7 mm series 5 image 25. Another at the tail is 3 mm  series 5 image 23. After contrast administration there is no visible  enhancement. Remainder of the pancreas appears unremarkable without  acute abnormality.    SPLEEN: Normal.    ADRENAL GLANDS: Normal.    KIDNEYS: No significant mass,  or hydronephrosis. There are simple or  benign cysts. No follow up is needed.    BOWEL:  Limited assessment.    ADDITIONAL FINDINGS: No enlarged lymph nodes.    MUSCULOSKELETAL: Unremarkable      Impression    IMPRESSION:   Small cystic lesions of the pancreas suggesting cystic pancreatic  neoplasm such as intraductal papillary mucinous neoplasms. Largest is  7 mm. See below for imaging guidelines.  Enhancing nodule at the left liver has features suggestive of a small  hemangioma.  Suggestion of mild fatty liver.    REFERENCE:  Revisions of international consensus Fuk[place]oka guidelines for the  management of IPMN of the pancreas. Pancreatology 17(5):738-753.    Cysts smaller than 30 mm:  Largest cyst less than 10 mm: CT or MRI/MRCP in 6 months and then  every 2 years if no change  Largest cyst between 10-20 mm: CT or MRI/MRCP every 6 months for 1  year and then yearly for 2 years and then every 2 years if no change  Largest cyst between 20-30 mm: EUS in 3-6 months and then annual  surveillance alternating between MRI and EUS as appropriate        SHAKEEL BARBER MD         SYSTEM ID:  [id number]   Echocardiogram Complete     Value    LVEF  60-65%    Narrative    [id number]  [id number]  [id number]  [id number]^RENÉE^ALYX^KAZ     [facility]  Echocardiography Laboratory  [address] [place] [address]     Name: RUPINDER HORN  MRN: [id number]  : 1980  Study Date: 2024 12:43 PM  Age: 44 yrs  Gender: Male  Patient Location: [facility]  Reason For Study: Bradycardia - Sinus  Ordering Physician: ALYX CHINCHILLA  Referring Physician: [facility], [facility]  Performed By: Kalani Brunson     BSA: 2.3 m2  Height: 72 in  Weight: 230 lb  HR: 68  BP: 162/110 mmHg  ______________________________________________________________________________  Procedure  Complete Portable Echo Adult. Optison (NDC #[id number]) given intravenously.  Technically difficult study.  ______________________________________________________________________________  Interpretation Summary     The left ventricle  is normal in structure, function and size.  The visual ejection fraction is 60-65%.  The right ventricle is normal in structure, function and size.  Aortic root dilatation is present. ( 40mm at level of sinuses)  Doopler interrogation does not demonstrate signficant stenosis or  insufficiency invovlng cardiac valves     No old studies for comparison.     ______________________________________________________________________________  Left Ventricle  The left ventricle is normal in structure, function and size. There is normal  left ventricular wall thickness. The visual ejection fraction is 60-65%. Left  ventricular diastolic function is normal. No regional wall motion  abnormalities noted. There is no thrombus seen in the left ventricle.     Right Ventricle  The right ventricle is normal in structure, function and size. There is no  mass or thrombus in the right ventricle.     Atria  Normal left atrial size. Right atrial size is normal. There is no atrial shunt  seen. The left atrial appendage is not well visualized.     Mitral Valve  The mitral valve leaflets appear normal. There is no evidence of stenosis,  fluttering, or prolapse. There is no mitral regurgitation noted. There is no  mitral valve stenosis.     Tricuspid Valve  Normal tricuspid valve. No tricuspid regurgitation. Right ventricular systolic  pressure could not be approximated due to inadequate tricuspid regurgitation.  There is no tricuspid stenosis.     Aortic Valve  The aortic valve is trileaflet. No aortic regurgitation is present. No aortic  stenosis is present.     Pulmonic Valve  Normal pulmonic valve. There is no pulmonic valvular regurgitation. There is  no pulmonic valvular stenosis.     Vessels  Aortic root dilatation is present. Normal size ascending aorta. The inferior  vena cava is normal. The pulmonary artery is normal size.     Pericardium  The pericardium appears normal. There is no pleural effusion.     Rhythm  Sinus rhythm was  noted.  ______________________________________________________________________________  MMode/2D Measurements & Calculations  IVSd: 0.97 cm     LVIDd: 5.0 cm  LVIDs: 3.3 cm  LVPWd: 1.2 cm  FS: 33.4 %  LV mass(C)d: 197.9 grams  LV mass(C)dI: 87.6 grams/m2  Ao root diam: 3.7 cm  asc Aorta Diam: 3.1 cm  LVOT diam: 2.2 cm  LVOT area: 3.7 cm2  Ao root diam index Ht(cm/m): 2.0  Ao root diam index BSA (cm/m2): 1.7  Asc Ao diam index BSA (cm/m2): 1.4  Asc Ao diam index Ht(cm/m): 1.7  LA Volume (BP): 57.3 ml     LA Volume Index (BP): 25.4 ml/m2  RV Base: 3.9 cm  RWT: 0.47  TAPSE: 3.1 cm     Doppler Measurements & Calculations  MV E max deion: 114.0 cm/sec  MV A max deion: 84.9 cm/sec  MV E/A: 1.3  MV max P.8 mmHg  MV mean P.8 mmHg  MV V2 VTI: 32.5 cm  MVA(VTI): 3.3 cm2  MV dec slope: 655.5 cm/sec2  MV dec time: 0.17 sec  Ao V2 max: 127.0 cm/sec  Ao max P.0 mmHg  Ao V2 mean: 86.2 cm/sec  Ao mean PG: 3.0 mmHg  Ao V2 VTI: 28.6 cm  KANE(I,D): 3.8 cm2  KANE(V,D): 4.0 cm2  LV V1 max P.5 mmHg  LV V1 max: 137.0 cm/sec  LV V1 VTI: 29.1 cm  SV(LVOT): 108.4 ml  SI(LVOT): 47.9 ml/m2  PA V2 max: 99.7 cm/sec  PA max P.0 mmHg  PA acc time: 0.10 sec  AV Deion Ratio (DI): 1.1  KANE Index (cm2/m2): 1.7     E/E' av.2  Lateral E/e': 8.5  Medial E/e': 9.9  RV S Deion: 16.3 cm/sec     ______________________________________________________________________________  Report approved by: Dr. Steve Pearson 2024 03:14 PM         Echo Stress Echocardiogram    Narrative    [id number]  [id number]  [id number]  [id number]^STEFANIE^OBIE     [facility]  Echocardiography Laboratory  [address] [place] [address]     Name: RUPINDER HORN  MRN: [id number]  : 1980  Study Date: 2024 08:11 AM  Age: 44 yrs  Gender: Male  Patient [facility]: [facility]  Reason For Study: Bradycardia - Sinus, Angina Pectoris  History: Chest Pain,Bradycardia  Ordering Physician: IMAN WATTS  Referring Physician: None  Performed  By: Miryam Nj     BSA: 2.2 m2  Height: 72 in  Weight: 225 lb  HR: 84  BP: 130/74 mmHg  ______________________________________________________________________________  Procedure  Stress Echo Complete. Contrast Optison.  ______________________________________________________________________________  Interpretation Summary  The Duke treadmill score was low risk ( >5 Burgos score).  Contrast was used without apparent complications. This was a normal stress  echocardiogram with no evidence of stress-induced ischemia.  ______________________________________________________________________________  Stress  The patient exercised 10:58.  RPP [id number].  This study was stopped as the patient achieved an adequate exercise effort for  a diagnostic study.  There was a normal BP response to exercise.  The patient exhibited no chest pain during exercise.  Exercise was stopped due to fatigue.  Target Heart Rate was achieved.  The Duke treadmill score was low risk ( >5 Duke score).  The EKG portion of this stress test was negative for inducible ischemia (see  echo results below).  No arrhythmia noted.  There was no chest pain or significant ST changes with exercise.  Normal resting wall motion and no stress-induced wall motion abnormality.  Left ventricular cavity size decreases with exercise.  Global LV systolic function augments with exercise.     Baseline  Normal baseline electrocardiogram.  The patient is in normal sinus rhythm.  Normal left ventricular function and wall motion at rest.  The visual ejection fraction is estimated at 55-60%.  No hemodynamically significant valvular abnormalities on 2D or color flow  imaging.     Stress Results             Protocol:  Pantera          Maximum Predicted HR:   178 bpm             Target HR: 151 bpm        % Maximum Predicted HR: 86 %            Stage  DurationHeart Rate  BP                Comment                (mm:ss)   (bpm)        Stage 1   3:00      87    142/68        Stage 2   3:00       97    154/74        Stage 3   3:00     113    164/80        Stage 4   1:58     153    166/82FAC: Average       RecoveryR  7:34      75    132/78Duke Treadmill Score: 11 (Low Risk)            Stress Duration:   10:58 mm:ss *        Recovery Time: 7:34 mm:ss         Maximum Stress HR: 153 bpm *            METS:          14     ______________________________________________________________________________  Report approved by: Kyle Pastor 07/17/2024 10:38 AM     ______________________________________________________________________________          Discharge Medications   Discharge Medication List as of 7/17/2024 12:38 PM        CONTINUE these medications which have NOT CHANGED    Details   acetaminophen (TYLENOL) 500 MG tablet Take 1,000 mg by mouth every 6 hours as needed for mild pain, Historical      clindamycin (CLEOCIN) 300 MG capsule Take 300 mg by mouth 3 times daily, Historical      predniSONE (DELTASONE) 20 MG tablet Take 40 mg by mouth daily, Historical           STOP taking these medications       ibuprofen (ADVIL/MOTRIN) 200 MG tablet Comments:   Reason for Stopping:             Allergies   No Known Allergies

## 2024-07-17 NOTE — UTILIZATION REVIEW
"  Admission Status; Secondary Review Determination         Under the authority of the Utilization Management Committee, the utilization review process indicated a secondary review on the above patient.  The review outcome is based on review of the medical records, discussions with staff, and applying clinical experience noted on the date of the review.        (xxx)      Inpatient Status Appropriate - This patient's medical care is consistent with medical management for inpatient care and reasonable inpatient medical practice.      () Observation Status Appropriate - This patient does not meet hospital inpatient criteria and is placed in observation status. If this patient's primary payer is Medicare and was admitted as an inpatient, Condition Code 44 should be used and patient status changed to \"observation\".   () Admission Status NOT Appropriate - This patient's medical care is not consistent with medical management for Inpatient or Observation Status.          RATIONALE FOR DETERMINATION     Kin Pagan is a 44 year old male with a past medical history of diverticulitis and CKD who was admitted on 7/16/2024 with acute diffuse upper abdominal pain with associated nausea and emesis.  Labs show wbc 19.2 and potassium 2.8.  EKG with bradycardia (30s) and prolonged QTc.  Atropine given with improvement in heart rate.  Cardiology consulted and felt EKG changes related to hypokalemia.   CT imaging revealed an indeterminate 1.1 cm enhancing lesion in the left lobe of the liver, a 0.9 cm hypodense lesion in the pancreas, likely a side branch intraductal papillary mucinous neoplasm and colonic diverticulosis with circumferential wall thickening of the sigmoid colon.  He is admitted for close clinical monitoring, electrolyte replacement, pain management, GI/Card consultations, MRCP, and further evaluation.  IP status is appropriate.    The severity of illness, intensity of service provided, expected LOS and risk for " adverse outcome make the care complex, high risk and appropriate for hospital admission.        The information on this document is developed by the utilization review team in order for the business office to ensure compliance.  This only denotes the appropriateness of proper admission status and does not reflect the quality of care rendered.         The definitions of Inpatient Status and Observation Status used in making the determination above are those provided in the CMS Coverage Manual, Chapter 1 and Chapter 6, section 70.4.      Sincerely,     Blossom Handley MD  Physician Advisor   Utilization Review/ Case Management  [facility].

## 2024-07-17 NOTE — PROGRESS NOTES
GASTROENTEROLOGY PROGRESS NOTE     SUBJECTIVE:  No abdominal pain. Had BM yesterday. Passing flatus. No rectal bleeding or melena. Had bout of emesis yesterday felt related to the food he had. Denies any recurrent n/v since that time.      OBJECTIVE:  /80   Pulse 62   Temp 98.3  F (36.8  C) (Oral)   Resp 13   Ht 1.829 m (6')   Wt 104.3 kg (230 lb)   SpO2 99%   BMI 31.19 kg/m    Temp (24hrs), Av.5  F (36.4  C), Min:96.8  F (36  C), Max:98.3  F (36.8  C)    Patient Vitals for the past 72 hrs:   Weight   24 1010 104.3 kg (230 lb)       Intake/Output Summary (Last 24 hours) at 2024 1018  Last data filed at 2024 0600  Gross per 24 hour   Intake 1741.66 ml   Output --   Net 1741.66 ml        PHYSICAL EXAM  Gen: alert, oriented, NAD  Abd: soft, NT/ND, +BS      Additional Comments:  ROS, FH, SH: See initial GI consult for details.     I have reviewed the patient's new clinical lab results:     Recent Labs   Lab Test 24  1050 24  1019   WBC 11.1*  --   --   --  19.2*   HGB 11.8*  11.8* 11.7* 12.0*   < > 14.0   MCV 90  --   --   --  92     --   --   --  182    < > = values in this interval not displayed.     Recent Labs   Lab Test 24  1050 24  1019   POTASSIUM 3.8 3.9 3.3* 2.8*   CHLORIDE 108*  --  108 107   CO2 25  --   --  23   BUN 11.5  --  24 19.2   ANIONGAP 8  --   --  15     Recent Labs   Lab Test 24  1419 24  1019   ALBUMIN 3.6  --  4.2   BILITOTAL 0.3  --  0.2   ALT 31  --  44   AST 12  --  24   PROTEIN  --  Negative  --    LIPASE  --   --  39     IMAGING:  MRCP :  IMPRESSION:   Small cystic lesions of the pancreas suggesting cystic pancreatic  neoplasm such as intraductal papillary mucinous neoplasms. Largest is  7 mm. See below for imaging guidelines.  Enhancing nodule at the left liver has features suggestive of a small  hemangioma.  Suggestion of  mild fatty liver.    CT aortic surgery 7/16:  IMPRESSION:  1.  No aortic aneurysm or dissection. No acute findings in the chest,  abdomen and pelvis.  2.  A 1.1 cm enhancing lesion in the left lobe of the liver,  indeterminate but likely a flash filling hemangioma. Consider  follow-up nonemergent MRI for further evaluation.  3.  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 Consider nonemergent MRCP.  4.  Colonic diverticulosis. Circumferential wall thickening of the  sigmoid colon, likely related to prior episodes of diverticulitis.  Consider follow-up colonoscopy.    Assessment:  44-year-old male with a past medical history of diverticulitis, chronic kidney disease admitted 7/16 with acute diffuse upper abdominal pain with associated nausea, vomiting, possible hematochezia with CT scan showing indeterminate 1.1 cm enhancing lesion in the left lobe of the liver, 0.9 cm hypodense lesion in the pancreas likely secondary to sidebranch IPMN, colonic diverticulosis with circumferential wall thickening of the sigmoid colon without clear diverticulitis. Thickening appeared chronic.    1.  Abdominal pain, nausea, vomiting.  History of previous episode of diverticulitis in 2018 with leukocytosis here.  Etiology of the pain not entirely. Pain has improved since admission.  Has had intermittent rectal bleeding since 2018 likely related to hemorrhoids.  No prior colonoscopy.  Differential includes inflammatory bowel disease, malignancy, diverticulitis vs SCAD, infectious gastroenteritis (although has not had any diarrhea).     WBC trending down. No fevers. On clindamycin due to previous dental infection. HGB stable.     2. Pancreatic lesion. Noted on CT. MRCP yesterday notes lesion consistent with side branch IPMN, largest 7mm.     3. Liver lesion. Noted on CT. MRCP yesterday notes lesion most consistent with hemangioma.     4. CKD.     Plan:  --Regular diet as  tolerated.   --Our office will arrange outpatient EUS/FNA of pancreatic lesion and colonoscopy to follow up on rectal bleeding/sigmoid thickening.   --Ok to discharge today from GI standpoint.     Will review further with Dr. Hanson. Updated Dr. Ashley.     Time spent: 25 minutes, greater than 50% of the visit was spent in counseling/coordination of care.     Rosemary Lora, PAC  [facility] ([facility])

## 2024-07-17 NOTE — PROGRESS NOTES
Pt discharged via self transport. AVS reviewed, questions answered. All pt belongings sent with pt at discharge.

## 2024-07-17 NOTE — PLAN OF CARE
Goal Outcome Evaluation:      Plan of Care Reviewed With: patient    Overall Patient Progress: improvingOverall Patient Progress: improving         ICU End of Shift Summary.  For vital signs and complete assessments, please see documentation flowsheets.     Pertinent assessments: Alert and oriented, afebrile, denies any pain, Tele SB upto 50's no atropine need. BP stbale. Room air saturating well LS clear. Ambulatory to bathroom. Regular diet good appetite. 2 PIV's. On  ml/hr.   Major Shift Events:   > Able to sleep and awake in between cares.   > Replace K, Mg and Phos recheck liseth.  Plan (Upcoming Events): Continue ongoin Icu care.   Discharge/Transfer Needs: TBD    Bedside Shift Report Completed : Y  Bedside Safety Check Completed: Y      Problem: Adult Inpatient Plan of Care  Goal: Plan of Care Review  Description: The Plan of Care Review/Shift note should be completed every shift.  The Outcome Evaluation is a brief statement about your assessment that the patient is improving, declining, or no change.  This information will be displayed automatically on your shift  note.  7/17/2024 0428 by Cassie Corea RN  Flowsheets (Taken 7/17/2024 0428)  Outcome Evaluation: stable bradycardia upto 50's no atriphine needed. BP stable.  7/17/2024 0426 by Cassie Corea RN  Flowsheets (Taken 7/17/2024 0426)  Plan of Care Reviewed With: patient  Overall Patient Progress: improving  Goal: Absence of Hospital-Acquired Illness or Injury  Intervention: Identify and Manage Fall Risk  Recent Flowsheet Documentation  Taken 7/17/2024 0419 by Cassie Corea, RN  Safety Promotion/Fall Prevention:   activity supervised   clutter free environment maintained   increased rounding and observation   increase visualization of patient   nonskid shoes/slippers when out of bed   patient and family education   room organization consistent   room near nurse's station   safety round/check completed   supervised activity    treat underlying cause  Taken 7/17/2024 0011 by Cassie Corea RN  Safety Promotion/Fall Prevention:   activity supervised   clutter free environment maintained   increased rounding and observation   increase visualization of patient   nonskid shoes/slippers when out of bed   patient and family education   room organization consistent   room near nurse's station   safety round/check completed   supervised activity   treat underlying cause  Taken 7/16/2024 2027 by Cassie Corea RN  Safety Promotion/Fall Prevention:   activity supervised   clutter free environment maintained   increased rounding and observation   increase visualization of patient   nonskid shoes/slippers when out of bed   patient and family education   room organization consistent   room near nurse's station   safety round/check completed   supervised activity   treat underlying cause  Intervention: Prevent Skin Injury  Recent Flowsheet Documentation  Taken 7/17/2024 0419 by Cassie Corea RN  Body Position: position changed independently  Device Skin Pressure Protection:   adhesive use limited   tubing/devices free from skin contact  Taken 7/17/2024 0214 by Cassie Corea RN  Body Position: position changed independently  Taken 7/17/2024 0011 by Cassie Corea RN  Body Position: position changed independently  Device Skin Pressure Protection:   adhesive use limited   tubing/devices free from skin contact  Taken 7/16/2024 2027 by Cassie Corea RN  Body Position: position changed independently  Device Skin Pressure Protection:   adhesive use limited   tubing/devices free from skin contact  Intervention: Prevent and Manage VTE (Venous Thromboembolism) Risk  Recent Flowsheet Documentation  Taken 7/17/2024 0419 by Cassie Corea RN  VTE Prevention/Management: SCDs off (sequential compression devices)  Taken 7/17/2024 0011 by Cassie Corea RN  VTE Prevention/Management: SCDs off (sequential  compression devices)  Taken 7/16/2024 2027 by Cassie Corea RN  VTE Prevention/Management: SCDs off (sequential compression devices)  Intervention: Prevent Infection  Recent Flowsheet Documentation  Taken 7/17/2024 0419 by Cassie Corea RN  Infection Prevention:   environmental surveillance performed   hand hygiene promoted   equipment surfaces disinfected  Taken 7/17/2024 0011 by Cassie Corea RN  Infection Prevention:   environmental surveillance performed   hand hygiene promoted   equipment surfaces disinfected  Taken 7/16/2024 2027 by Cassie Corea RN  Infection Prevention:   environmental surveillance performed   hand hygiene promoted   equipment surfaces disinfected  Goal: Optimal Comfort and Wellbeing  Intervention: Provide Person-Centered Care  Recent Flowsheet Documentation  Taken 7/17/2024 0419 by Cassie Corea RN  Trust Relationship/Rapport:   care explained   choices provided  Taken 7/17/2024 0011 by Cassie Corea RN  Trust Relationship/Rapport:   care explained   choices provided  Taken 7/16/2024 2027 by Cassie Corea RN  Trust Relationship/Rapport:   care explained   choices provided     Problem: Dysrhythmia  Goal: Normalized Cardiac Rhythm  Intervention: Monitor and Manage Cardiac Rhythm Effect  Recent Flowsheet Documentation  Taken 7/17/2024 0419 by Cassie Corea RN  VTE Prevention/Management: SCDs off (sequential compression devices)  Taken 7/17/2024 0011 by Cassie Corea RN  VTE Prevention/Management: SCDs off (sequential compression devices)  Taken 7/16/2024 2027 by Cassie Corea RN  VTE Prevention/Management: SCDs off (sequential compression devices)

## 2024-07-18 ENCOUNTER — PATIENT OUTREACH (OUTPATIENT)
Dept: CARE COORDINATION | Facility: CLINIC | Age: 44
End: 2024-07-18
Payer: MEDICAID

## 2024-07-18 ENCOUNTER — TELEPHONE (OUTPATIENT)
Dept: CARDIOLOGY | Facility: CLINIC | Age: 44
End: 2024-07-18
Payer: MEDICAID

## 2024-07-18 NOTE — PROGRESS NOTES
Connected Care Resource Center: [facility]    Background: Transitional Care Management program identified per system criteria and reviewed by [facility] [place] team for possible outreach.    Assessment: Upon chart review, CCR Team member will not proceed with patient outreach related to this episode of Transitional Care Management program due to reason below:    Patient has active communication with a nurse, provider or care team for reason of post-hospital follow up plan.  Outreach call by CCRC team not indicated to minimize duplicative efforts.     Plan: Transitional Care Management episode addressed appropriately per reason noted above.      Merline Madrid  Community Health Worker  [facility] [place]  [phone]      *Connected Care Resource Team does NOT follow patient ongoing. Referrals are identified based on internal discharge reports and the outreach is to ensure patient has an understanding of their discharge instructions.     Patient to US

## 2024-07-18 NOTE — TELEPHONE ENCOUNTER
Patient was admitted to [facility] 7/16-7/17/24    PMH: # Sinus bradycardia, resolved with atropine, likely 2/2 severe hypokalemia   # Prolonged QTc, need to monitor and reassess after electrolyte replacement    Plan:  -TTE formal read pending, on preliminary review, normal cardiac structure and function  - Agree with replacement of potassium to greater than 4, magnesium to greater than 2  - Avoid QT prolonging medications  - Suspect electrolyte derangements may be related, at least in part, to poor diet/nutrition  - Encouraged to cut back on cannabis use as much as able, ideally stop entirely  - Cardiac telemetry  - Zio patch monitor ordered to be mailed after discharge  - Cardiology will sign off    Called patient to discuss any post hospital d/c questions he may have, review medication changes, and confirm f/u appts. Patient denied any questions regarding new medications or changes with their PTA medications.    Patient advised to call clinic with any cardiac related questions or concerns prior to this carlene't. Patient verbalized understanding and agreed with plan.  Patient was given number for Dr. Lala nursing team to call with questions or concerns.    Miriam ARNDT

## 2024-07-20 LAB — B BURGDOR DNA SPEC QL NAA+PROBE: NOT DETECTED

## 2024-07-21 LAB
BACTERIA BLD CULT: NO GROWTH
BACTERIA BLD CULT: NO GROWTH

## 2024-07-23 ENCOUNTER — ORDERS ONLY (AUTO-RELEASED) (OUTPATIENT)
Dept: INTENSIVE CARE | Facility: CLINIC | Age: 44
End: 2024-07-23
Payer: MEDICAID

## 2024-07-23 DIAGNOSIS — R00.1 BRADYCARDIA: ICD-10-CM

## 2024-08-26 ENCOUNTER — TELEPHONE (OUTPATIENT)
Dept: CARDIOLOGY | Facility: CLINIC | Age: 44
End: 2024-08-26
Payer: MEDICAID

## 2024-08-26 DIAGNOSIS — R00.1 BRADYCARDIA: Primary | ICD-10-CM

## 2024-08-26 NOTE — TELEPHONE ENCOUNTER
Received notification from [employer] that Zio monitor is overdue pending return.  Monitor was ordered by Dr. Lala for a 14 day wear time.  Please reach out to patient to verify status of monitor.  If further monitoring is needed, a new order may need to be entered.

## 2024-08-28 ENCOUNTER — ORDERS ONLY (AUTO-RELEASED) (OUTPATIENT)
Dept: CARDIOLOGY | Facility: CLINIC | Age: 44
End: 2024-08-28
Payer: MEDICAID

## 2024-08-28 DIAGNOSIS — R00.1 BRADYCARDIA: ICD-10-CM

## 2024-08-28 NOTE — TELEPHONE ENCOUNTER
Spoke to patient, says he never received the monitor, address on file is old. Address updated in Epic. New order placed for mail out cm.

## 2024-09-20 PROCEDURE — 93244 EXT ECG>48HR<7D REV&INTERPJ: CPT | Performed by: INTERNAL MEDICINE

## 2024-09-23 ENCOUNTER — TELEPHONE (OUTPATIENT)
Dept: CARDIOLOGY | Facility: CLINIC | Age: 44
End: 2024-09-23
Payer: MEDICAID

## 2024-09-23 NOTE — TELEPHONE ENCOUNTER
----- Message from Tanmay Lala sent at 9/23/2024  8:00 AM CDT -----  No significant arrhythmia detected. Avoid QT prolonging medications. Follow up in cardiology clinic as needed     ---------------------------------------------    Patient called and VM left informing patient that Dr. Lala has reviewed 5 day zio patch results and of above interpretation and recommendation. Patient instructed on medications classes and medications that increase QT interval. Instructed to check with ordering provider or pharmacist when prescribed medications going forward if it can increase QT interval as this should be avoided. Patient encouraged to call team 2 nurse line back to discuss in more detail or with any questions or concerns.

## 2024-09-23 NOTE — RESULT ENCOUNTER NOTE
No significant arrhythmia detected. Avoid QT prolonging medications. Follow up in cardiology clinic as needed